# Patient Record
Sex: FEMALE | Race: WHITE | NOT HISPANIC OR LATINO | Employment: OTHER | URBAN - METROPOLITAN AREA
[De-identification: names, ages, dates, MRNs, and addresses within clinical notes are randomized per-mention and may not be internally consistent; named-entity substitution may affect disease eponyms.]

---

## 2018-01-13 NOTE — PROGRESS NOTES
Assessment    1  Acute sinusitis (461 9) (J01 90)   2  Benign essential hypertension (401 1) (I10)   3  Hyperlipidemia (272 4) (E78 5)   4  Impaired fasting glucose (790 21) (R73 01)    Plan  Acute sinusitis    · Doxycycline Hyclate 100 MG Oral Tablet; TAKE 1 TABLET EVERY 12 HOURS DAILY   · Promethazine VC/Codeine 6 25-5-10 MG/5ML Oral Syrup; TAKE 5 ML Bedtime PRN  cough    Discussion/Summary  Discussion Summary:   Will treat w/ abx based on recent exposure to pneumonia and hospital    Medication SE Review and Pt Understands Tx: Possible side effects of new medications were reviewed with the patient/guardian today  The treatment plan was reviewed with the patient/guardian  The patient/guardian understands and agrees with the treatment plan      Chief Complaint  Chief Complaint Free Text Note Form: Here to establish  H/o HTN, HLD  Has URI today      History of Present Illness  HPI: New patient here to establish  Having cough, congestion and sore throat, ear pain for past 5+ days  mom recently in the hospital for pneumonia  No current tx  H/O HTN, HLD - following up with a PCP and cardiologist in [place], [place] where she has another house  She states she has labs that she needs to get done for them  She is not sure why she is on metformin states her last doctor just put her on it  Review of Systems  Complete-Female:   Constitutional: feeling poorly, but no fever  ENT: as noted in HPI  Cardiovascular: No complaints of slow heart rate, no fast heart rate, no chest pain, no palpitations, no leg claudication, no lower extremity edema  Respiratory: cough  Endocrine: No complaints of proptosis, no hot flashes, no muscle weakness, no deepening of the voice, no feelings of weakness  Past Medical History  Active Problems And Past Medical History Reviewed: The active problems and past medical history were reviewed and updated today  Surgical History    1   History of Tonsillectomy    Family History 1  Family history of hypertension (V17 49) (Z82 49)    2  Family history of lung cancer (V16 1) (Z80 1)    Social History  Social History Reviewed: The social history was reviewed and updated today  Current Meds   1  AmLODIPine Besylate 5 MG Oral Tablet; Therapy: (Recorded:19Jan2016) to Recorded   2  Benicar 40 MG Oral Tablet; Therapy: (Recorded:19Jan2016) to Recorded   3  Carvedilol 6 25 MG Oral Tablet; Therapy: (Recorded:19Jan2016) to Recorded   4  Daily Value Multivitamin TABS; Therapy: (Recorded:19Jan2016) to Recorded   5  EC-81 Aspirin TBEC; Therapy: (Recorded:19Jan2016) to Recorded   6  Lovaza 1 GM Oral Capsule; Therapy: (Recorded:19Jan2016) to Recorded   7  MetFORMIN HCl  MG Oral Tablet Extended Release 24 Hour; Therapy: (Recorded:19Jan2016) to Recorded   8  Simvastatin 20 MG Oral Tablet; Therapy: (Recorded:19Jan2016) to Recorded   9  Vitamin C CAPS; Therapy: (Recorded:19Jan2016) to Recorded    Allergies    1  Penicillins    Vitals  Vital Signs [Data Includes: Current Encounter]    Recorded: [id number] 04:39PM   Temperature 98 5 F   Heart Rate 88   Systolic 670   Diastolic 82   Height 5 ft 3 in   Weight 211 lb 2 oz   BMI Calculated 37 4   BSA Calculated 1 98   O2 Saturation 96     Physical Exam    Constitutional   General appearance: No acute distress, well appearing and well nourished  Eyes   Conjunctiva and lids: No swelling, erythema or discharge  Pupils and irises: Equal, round and reactive to light  Ears, Nose, Mouth, and Throat   Otoscopic examination: Tympanic membranes translucent with normal light reflex  Canals patent without erythema  Oropharynx: Normal with no erythema, edema, exudate or lesions  Pulmonary   Respiratory effort: No increased work of breathing or signs of respiratory distress  Auscultation of lungs: Clear to auscultation  Cardiovascular   Auscultation of heart: Normal rate and rhythm, normal S1 and S2, without murmurs      Examination of extremities for edema and/or varicosities: Normal     Skin   Skin and subcutaneous tissue: Normal without rashes or lesions      Psychiatric   Orientation to person, place, and time: Normal     Mood and affect: Normal          Signatures   Electronically signed by : Christiano Stiles MD; Jan 19 2016  4:42PM EST                       (Author)

## 2019-10-04 ENCOUNTER — OFFICE VISIT (OUTPATIENT)
Dept: URGENT CARE | Facility: CLINIC | Age: 64
End: 2019-10-04
Payer: MEDICARE

## 2019-10-04 ENCOUNTER — APPOINTMENT (OUTPATIENT)
Dept: URGENT CARE | Facility: CLINIC | Age: 64
End: 2019-10-04
Payer: MEDICARE

## 2019-10-04 VITALS
SYSTOLIC BLOOD PRESSURE: 160 MMHG | BODY MASS INDEX: 36.4 KG/M2 | DIASTOLIC BLOOD PRESSURE: 74 MMHG | WEIGHT: 213.2 LBS | OXYGEN SATURATION: 93 % | HEART RATE: 83 BPM | RESPIRATION RATE: 18 BRPM | TEMPERATURE: 97.6 F | HEIGHT: 64 IN

## 2019-10-04 DIAGNOSIS — J01.90 ACUTE SINUSITIS, RECURRENCE NOT SPECIFIED, UNSPECIFIED LOCATION: Primary | ICD-10-CM

## 2019-10-04 PROCEDURE — G0463 HOSPITAL OUTPT CLINIC VISIT: HCPCS | Performed by: PHYSICIAN ASSISTANT

## 2019-10-04 PROCEDURE — 99203 OFFICE O/P NEW LOW 30 MIN: CPT | Performed by: PHYSICIAN ASSISTANT

## 2019-10-04 RX ORDER — DOXYCYCLINE 100 MG/1
100 TABLET ORAL 2 TIMES DAILY
Qty: 20 TABLET | Refills: 0 | Status: SHIPPED | OUTPATIENT
Start: 2019-10-04 | End: 2019-10-14

## 2019-10-04 RX ORDER — OMEGA-3-ACID ETHYL ESTERS 1 G/1
CAPSULE, LIQUID FILLED ORAL
COMMUNITY

## 2019-10-04 RX ORDER — ERGOCALCIFEROL (VITAMIN D2) 10 MCG
TABLET ORAL
COMMUNITY

## 2019-10-04 RX ORDER — SIMVASTATIN 20 MG
TABLET ORAL
COMMUNITY

## 2019-10-04 RX ORDER — AMLODIPINE BESYLATE 5 MG/1
TABLET ORAL
COMMUNITY

## 2019-10-04 RX ORDER — METFORMIN HYDROCHLORIDE 500 MG/1
TABLET, EXTENDED RELEASE ORAL
COMMUNITY

## 2019-10-04 RX ORDER — ASCORBIC ACID 1000 MG
TABLET, EXTENDED RELEASE ORAL
COMMUNITY
Start: 2017-02-23

## 2019-10-04 RX ORDER — CARVEDILOL 6.25 MG/1
TABLET ORAL
COMMUNITY
Start: 2019-04-08

## 2019-10-04 RX ORDER — LOSARTAN POTASSIUM 100 MG/1
100 TABLET ORAL DAILY
COMMUNITY

## 2019-10-04 NOTE — PATIENT INSTRUCTIONS
Take doxycycline 100 mg twice a day times 10 days  Avoid excessive sun exposure, or sunscreen if going to be in the sun  Use Flonase as directed  Increase fluid intake

## 2019-10-04 NOTE — PROGRESS NOTES
[facility]        NAME: Javier Macedo is a 75 y o  female  : 1955    MRN: [id number]  DATE: 2019  TIME: 10:23 AM    Assessment and Plan   Acute sinusitis, recurrence not specified, unspecified location [J01 90]  1  Acute sinusitis, recurrence not specified, unspecified location  doxycycline (ADOXA) 100 MG tablet         Patient Instructions       Follow up with PCP in 3-5 days  Proceed to  ER if symptoms worsen  Chief Complaint     Chief Complaint   Patient presents with    Sinusitis     monday    Cough     monday         History of Present Illness       Sinusitis   This is a new problem  Episode onset: x 5 days  The problem is unchanged  There has been no fever  Her pain is at a severity of 5/10  The pain is moderate  Associated symptoms include congestion, coughing, sinus pressure, sneezing and a sore throat  Pertinent negatives include no chills, ear pain, headaches or shortness of breath  Past treatments include nothing  The treatment provided mild relief  Review of Systems   Review of Systems   Constitutional: Negative for chills, fatigue and fever  HENT: Positive for congestion, sinus pressure, sneezing and sore throat  Negative for ear pain, hearing loss, postnasal drip and sinus pain  Eyes: Negative for pain and discharge  Respiratory: Positive for cough  Negative for chest tightness and shortness of breath  Cardiovascular: Negative for chest pain  Gastrointestinal: Negative for abdominal pain, constipation, nausea and vomiting  Genitourinary: Negative for difficulty urinating  Musculoskeletal: Negative for arthralgias and myalgias  Skin: Negative for rash  Neurological: Negative for dizziness and headaches  Psychiatric/Behavioral: Negative for behavioral problems           Current Medications       Current Outpatient Medications:     amLODIPine (NORVASC) 5 mg tablet, amlodipine 5 mg tablet, Disp: , Rfl:     Ascorbic Acid (VITAMIN C ER) 1000 MG TBCR, ascorbic acid (vitamin C) 1,000 mg chewable tablet, Disp: , Rfl:     ASPIRIN 81 PO, Take 1 tablet every day by oral route , Disp: , Rfl:     carvedilol (COREG) 6 25 mg tablet, TAKE ONE TABLET BY MOUTH TWICE A DAY, Disp: , Rfl:     losartan (COZAAR) 100 MG tablet, Take 100 mg by mouth daily, Disp: , Rfl:     metFORMIN (GLUCOPHAGE-XR) 500 mg 24 hr tablet, 4 tablets once daily, Disp: , Rfl:     Multiple Vitamin (DAILY VALUE MULTIVITAMIN) TABS, Take by mouth, Disp: , Rfl:     omega-3-acid ethyl esters (LOVAZA) 1 g capsule, 1 tablet daily, Disp: , Rfl:     simvastatin (ZOCOR) 20 mg tablet, 1 tablet daily, Disp: , Rfl:     doxycycline (ADOXA) 100 MG tablet, Take 1 tablet (100 mg total) by mouth 2 (two) times a day for 10 days, Disp: 20 tablet, Rfl: 0    Current Allergies     Allergies as of 10/04/2019 - Reviewed 10/04/2019   Allergen Reaction Noted    Penicillins  01/19/2016            The following portions of the patient's history were reviewed and updated as appropriate: allergies, current medications, past family history, past medical history, past social history, past surgical history and problem list      History reviewed  No pertinent past medical history  History reviewed  No pertinent surgical history  History reviewed  No pertinent family history  Medications have been verified  Objective   /74   Pulse 83   Temp 97 6 °F (36 4 °C) (Tympanic)   Resp 18   Ht 5' 4" (1 626 m)   Wt 96 7 kg (213 lb 3 2 oz)   SpO2 93%   BMI 36 60 kg/m²        Physical Exam     Physical Exam   Constitutional: She is oriented to person, place, and time  She appears well-developed and well-nourished  HENT:   Right Ear: Tympanic membrane and external ear normal    Left Ear: Tympanic membrane and external ear normal    Nose: Mucosal edema and rhinorrhea present  Right sinus exhibits maxillary sinus tenderness  Left sinus exhibits maxillary sinus tenderness     Mouth/Throat: Oropharynx is clear and moist and mucous membranes are normal        Neck: Normal range of motion  No edema present  Cardiovascular: Normal rate, regular rhythm, S1 normal, S2 normal and normal heart sounds  No murmur heard  Pulmonary/Chest: Effort normal and breath sounds normal  No respiratory distress  She has no wheezes  She has no rales  She exhibits no tenderness  Lymphadenopathy:     She has no cervical adenopathy  Neurological: She is alert and oriented to person, place, and time  Skin: Skin is warm, dry and intact  No rash noted  Psychiatric: She has a normal mood and affect  Her speech is normal and behavior is normal    Nursing note and vitals reviewed

## 2020-03-02 ENCOUNTER — APPOINTMENT (OUTPATIENT)
Dept: RADIOLOGY | Facility: CLINIC | Age: 65
End: 2020-03-02
Payer: MEDICARE

## 2020-03-02 ENCOUNTER — OFFICE VISIT (OUTPATIENT)
Dept: URGENT CARE | Facility: CLINIC | Age: 65
End: 2020-03-02
Payer: MEDICARE

## 2020-03-02 VITALS
HEART RATE: 96 BPM | OXYGEN SATURATION: 91 % | HEIGHT: 64 IN | BODY MASS INDEX: 37.05 KG/M2 | DIASTOLIC BLOOD PRESSURE: 82 MMHG | TEMPERATURE: 98.1 F | RESPIRATION RATE: 18 BRPM | SYSTOLIC BLOOD PRESSURE: 150 MMHG | WEIGHT: 217 LBS

## 2020-03-02 DIAGNOSIS — R05.9 COUGH: Primary | ICD-10-CM

## 2020-03-02 DIAGNOSIS — R05.9 COUGH: ICD-10-CM

## 2020-03-02 PROCEDURE — 99213 OFFICE O/P EST LOW 20 MIN: CPT | Performed by: PHYSICIAN ASSISTANT

## 2020-03-02 PROCEDURE — G0463 HOSPITAL OUTPT CLINIC VISIT: HCPCS | Performed by: PHYSICIAN ASSISTANT

## 2020-03-02 PROCEDURE — 71046 X-RAY EXAM CHEST 2 VIEWS: CPT

## 2020-03-02 RX ORDER — BENZONATATE 100 MG/1
100 CAPSULE ORAL 3 TIMES DAILY PRN
Qty: 30 CAPSULE | Refills: 0 | Status: SHIPPED | OUTPATIENT
Start: 2020-03-02

## 2020-03-02 RX ORDER — AZITHROMYCIN 250 MG/1
TABLET, FILM COATED ORAL
Qty: 6 TABLET | Refills: 0 | Status: SHIPPED | OUTPATIENT
Start: 2020-03-02 | End: 2020-03-06

## 2020-03-02 RX ORDER — DOXAZOSIN MESYLATE 1 MG/1
1 TABLET ORAL
COMMUNITY

## 2020-03-02 NOTE — PROGRESS NOTES
[facility]        NAME: Refdali Stevens is a 77 y o  female  : 1955    MRN: [id number]  DATE: 2020  TIME: 3:24 PM    Assessment and Plan   Cough [R05]  1  Cough  XR chest pa & lateral    azithromycin (ZITHROMAX) 250 mg tablet    benzonatate (TESSALON PERLES) 100 mg capsule         Patient Instructions     Patient Instructions   Chest x-ray shows possible right lower lobe infiltrate  Pulse ox is 91  We will treat with azithromycin for the cough  Follow up with PCP in 3-5 days  Proceed to  ER if symptoms worsen  Chief Complaint     Chief Complaint   Patient presents with    Cough     Pt c/o productive cough, head congestion, tatyana ear clogged x 1 week  History of Present Illness       77year-old female complains of 1 week of cough had congestion and ears feeling clogged  Cough is mildly productive  She tried promethazine DM at night for the cough  No nausea or vomiting  No ear drainage  No history of asthma or inhaler use  She denies shortness of breath  Review of Systems   Review of Systems   Constitutional: Negative for chills, fatigue and fever  HENT: Positive for congestion, ear pain and rhinorrhea  Negative for ear discharge and sore throat  Eyes: Negative for visual disturbance  Respiratory: Positive for cough and chest tightness  Negative for shortness of breath and wheezing  Cardiovascular: Negative for chest pain and palpitations  Gastrointestinal: Negative for diarrhea, nausea and vomiting  Neurological: Negative for dizziness           Current Medications       Current Outpatient Medications:     amLODIPine (NORVASC) 5 mg tablet, amlodipine 5 mg tablet, Disp: , Rfl:     Ascorbic Acid (VITAMIN C ER) 1000 MG TBCR, ascorbic acid (vitamin C) 1,000 mg chewable tablet, Disp: , Rfl:     ASPIRIN 81 PO, Take 1 tablet every day by oral route , Disp: , Rfl:     carvedilol (COREG) 6 25 mg tablet, TAKE ONE TABLET BY MOUTH TWICE A DAY, Disp: , Rfl:     doxazosin (CARDURA) 1 mg tablet, Take 1 mg by mouth daily at bedtime, Disp: , Rfl:     losartan (COZAAR) 100 MG tablet, Take 100 mg by mouth daily, Disp: , Rfl:     metFORMIN (GLUCOPHAGE-XR) 500 mg 24 hr tablet, 4 tablets once daily, Disp: , Rfl:     Multiple Vitamin (DAILY VALUE MULTIVITAMIN) TABS, Take by mouth, Disp: , Rfl:     omega-3-acid ethyl esters (LOVAZA) 1 g capsule, 1 tablet daily, Disp: , Rfl:     simvastatin (ZOCOR) 20 mg tablet, 1 tablet daily, Disp: , Rfl:     azithromycin (ZITHROMAX) 250 mg tablet, 2 tabs on day 1, then 1 tab daily for 4 days, Disp: 6 tablet, Rfl: 0    benzonatate (TESSALON PERLES) 100 mg capsule, Take 1 capsule (100 mg total) by mouth 3 (three) times a day as needed for cough, Disp: 30 capsule, Rfl: 0    Current Allergies     Allergies as of 03/02/2020 - Reviewed 03/02/2020   Allergen Reaction Noted    Penicillins  01/19/2016            The following portions of the patient's history were reviewed and updated as appropriate: allergies, current medications, past family history, past medical history, past social history, past surgical history and problem list      Past Medical History:   Diagnosis Date    Diabetes mellitus ([facility] [address] )     Hyperlipemia     Hypertension        Past Surgical History:   Procedure Laterality Date    TONSILLECTOMY      TUBAL LIGATION         Family History   Problem Relation Age of Onset    Hypertension Mother     Hypertension Father     Cancer Father     Heart disease Father          Medications have been verified  Objective   /82   Pulse 96   Temp 98 1 °F (36 7 °C) (Oral)   Resp 18   Ht 5' 4" (1 626 m)   Wt 98 4 kg (217 lb)   SpO2 91%   BMI 37 25 kg/m²        Physical Exam     Physical Exam   Constitutional: She is oriented to person, place, and time  She appears well-developed and well-nourished  No distress  HENT:   Head: Normocephalic and atraumatic     Right Ear: Tympanic membrane, external ear and ear canal normal  Tympanic membrane is not erythematous, not retracted and not bulging  No middle ear effusion  Left Ear: Tympanic membrane, external ear and ear canal normal  Tympanic membrane is not erythematous, not retracted and not bulging  No middle ear effusion  Nose: Nose normal  No mucosal edema or rhinorrhea  Right sinus exhibits no maxillary sinus tenderness and no frontal sinus tenderness  Left sinus exhibits no maxillary sinus tenderness and no frontal sinus tenderness  Mouth/Throat: Oropharynx is clear and moist and mucous membranes are normal  No posterior oropharyngeal erythema  Eyes: Pupils are equal, round, and reactive to light  Conjunctivae and EOM are normal  Right eye exhibits no chemosis and no discharge  Left eye exhibits no chemosis and no discharge  Right conjunctiva is not injected  Left conjunctiva is not injected  Neck: Normal range of motion  Neck supple  Cardiovascular: Normal rate, regular rhythm and normal heart sounds  Pulmonary/Chest: Effort normal and breath sounds normal  No respiratory distress  She has no wheezes  She has no rales  Lymphadenopathy:     She has no cervical adenopathy  Right cervical: No superficial cervical adenopathy present  Left cervical: No superficial cervical adenopathy present  Neurological: She is alert and oriented to person, place, and time  No cranial nerve deficit  Skin: Skin is warm and dry  No rash noted

## 2020-03-02 NOTE — PATIENT INSTRUCTIONS
Chest x-ray shows possible right lower lobe infiltrate  Pulse ox is 91  We will treat with azithromycin for the cough

## 2020-03-16 ENCOUNTER — APPOINTMENT (OUTPATIENT)
Dept: RADIOLOGY | Facility: CLINIC | Age: 65
End: 2020-03-16
Payer: MEDICARE

## 2020-03-16 ENCOUNTER — TELEPHONE (OUTPATIENT)
Dept: URGENT CARE | Facility: CLINIC | Age: 65
End: 2020-03-16

## 2020-03-16 ENCOUNTER — OFFICE VISIT (OUTPATIENT)
Dept: URGENT CARE | Facility: CLINIC | Age: 65
End: 2020-03-16
Payer: MEDICARE

## 2020-03-16 VITALS
OXYGEN SATURATION: 95 % | DIASTOLIC BLOOD PRESSURE: 78 MMHG | TEMPERATURE: 98.8 F | WEIGHT: 218 LBS | SYSTOLIC BLOOD PRESSURE: 156 MMHG | RESPIRATION RATE: 24 BRPM | BODY MASS INDEX: 37.42 KG/M2 | HEART RATE: 105 BPM

## 2020-03-16 DIAGNOSIS — R05.9 COUGH: ICD-10-CM

## 2020-03-16 DIAGNOSIS — J18.9 PNEUMONIA OF LEFT LOWER LOBE DUE TO INFECTIOUS ORGANISM: Primary | ICD-10-CM

## 2020-03-16 DIAGNOSIS — R05.9 COUGH: Primary | ICD-10-CM

## 2020-03-16 PROCEDURE — G0463 HOSPITAL OUTPT CLINIC VISIT: HCPCS | Performed by: FAMILY MEDICINE

## 2020-03-16 PROCEDURE — 71046 X-RAY EXAM CHEST 2 VIEWS: CPT

## 2020-03-16 PROCEDURE — 99214 OFFICE O/P EST MOD 30 MIN: CPT | Performed by: FAMILY MEDICINE

## 2020-03-16 RX ORDER — PREDNISONE 10 MG/1
TABLET ORAL
Qty: 15 TABLET | Refills: 0 | Status: SHIPPED | OUTPATIENT
Start: 2020-03-16 | End: 2020-03-22 | Stop reason: HOSPADM

## 2020-03-16 RX ORDER — BENZONATATE 200 MG/1
200 CAPSULE ORAL 3 TIMES DAILY PRN
Qty: 20 CAPSULE | Refills: 0 | Status: SHIPPED | OUTPATIENT
Start: 2020-03-16

## 2020-03-16 RX ORDER — ALBUTEROL SULFATE 90 UG/1
2 AEROSOL, METERED RESPIRATORY (INHALATION) EVERY 4 HOURS PRN
Qty: 8.5 G | Refills: 1 | Status: SHIPPED | OUTPATIENT
Start: 2020-03-16

## 2020-03-16 RX ORDER — DOXYCYCLINE 100 MG/1
100 TABLET ORAL 2 TIMES DAILY
Qty: 20 TABLET | Refills: 0 | Status: SHIPPED | OUTPATIENT
Start: 2020-03-16 | End: 2020-03-22 | Stop reason: HOSPADM

## 2020-03-16 NOTE — TELEPHONE ENCOUNTER
I reviewed the official chest x-ray reading with suggestion of lingular pneumonia  Antibiotic doxycycline 100 mg by mouth twice daily for 10 days was called into the pharmacy  I had left a message on patient's answering machine regarding the antibiotic and the x-ray reading 
02-Feb-2019 23:20

## 2020-03-16 NOTE — PROGRESS NOTES
[facility]        NAME: Isabela Neves is a 77 y o  female  : 1955    MRN: [id number]  DATE: 2020  TIME: 10:05 AM    Assessment and Plan   Cough [R05]  1  Cough  XR chest pa & lateral    predniSONE 10 mg tablet    benzonatate (TESSALON) 200 MG capsule    albuterol (ProAir HFA) 90 mcg/act inhaler         Patient Instructions   Persistent dry cough with wheezing  Viral illness versus persistent inflammatory changes after bronchitis  Will treat with steroid anti-inflammatory prednisone 10 mg-1 tablet by mouth up to 3 times a day for up to 5 days as needed for congestion and cough  Stop the medication if you notice increasing leg swelling or fluid retention  Benzonatate 200 mg-1 tablet by mouth up to 3 times a day as needed for cough  May also take plain Robitussin/Mucinex as needed for cough  Albuterol inhaler-1-2 puffs every 4-6 hours as needed for wheezing or shortness of breath  Follow-up with your primary care physician in no improvement in 4-5 days  Follow up with PCP in 3-5 days  Proceed to  ER if symptoms worsen  Chief Complaint     Chief Complaint   Patient presents with    Cold Like Symptoms     x 2 days, c/o cough, head and chest congestion  No fevers         History of Present Illness       Patient presents with recurrent dry cough, mild intermittent runny nose  No fevers  She was treated with a Z-Magno 2 weeks ago for similar cough with mild improvement  In the last several days patient has had worsening cough  No earaches, no sore throat  No GI symptoms  No rashes  No recent travel  Review of Systems   Review of Systems   Constitutional: Positive for fatigue  Negative for chills and fever  HENT: Positive for postnasal drip and rhinorrhea  Negative for sinus pain and sore throat  Respiratory: Positive for cough and wheezing  Cardiovascular: Negative for chest pain, palpitations and leg swelling     Gastrointestinal: Negative for diarrhea and vomiting  Skin: Negative for rash           Current Medications       Current Outpatient Medications:     albuterol (ProAir HFA) 90 mcg/act inhaler, Inhale 2 puffs every 4 (four) hours as needed for wheezing or shortness of breath, Disp: 8 5 g, Rfl: 1    amLODIPine (NORVASC) 5 mg tablet, amlodipine 5 mg tablet, Disp: , Rfl:     Ascorbic Acid (VITAMIN C ER) 1000 MG TBCR, ascorbic acid (vitamin C) 1,000 mg chewable tablet, Disp: , Rfl:     ASPIRIN 81 PO, Take 1 tablet every day by oral route , Disp: , Rfl:     benzonatate (TESSALON PERLES) 100 mg capsule, Take 1 capsule (100 mg total) by mouth 3 (three) times a day as needed for cough, Disp: 30 capsule, Rfl: 0    benzonatate (TESSALON) 200 MG capsule, Take 1 capsule (200 mg total) by mouth 3 (three) times a day as needed for cough, Disp: 20 capsule, Rfl: 0    carvedilol (COREG) 6 25 mg tablet, TAKE ONE TABLET BY MOUTH TWICE A DAY, Disp: , Rfl:     doxazosin (CARDURA) 1 mg tablet, Take 1 mg by mouth daily at bedtime, Disp: , Rfl:     losartan (COZAAR) 100 MG tablet, Take 100 mg by mouth daily, Disp: , Rfl:     metFORMIN (GLUCOPHAGE-XR) 500 mg 24 hr tablet, 4 tablets once daily, Disp: , Rfl:     Multiple Vitamin (DAILY VALUE MULTIVITAMIN) TABS, Take by mouth, Disp: , Rfl:     omega-3-acid ethyl esters (LOVAZA) 1 g capsule, 1 tablet daily, Disp: , Rfl:     predniSONE 10 mg tablet, 1 tab by mouth 3 times a day for 5 days, Disp: 15 tablet, Rfl: 0    simvastatin (ZOCOR) 20 mg tablet, 1 tablet daily, Disp: , Rfl:     Current Allergies     Allergies as of 03/16/2020 - Reviewed 03/16/2020   Allergen Reaction Noted    Penicillins  01/19/2016            The following portions of the patient's history were reviewed and updated as appropriate: allergies, current medications, past family history, past medical history, past social history, past surgical history and problem list      Past Medical History:   Diagnosis Date    Diabetes mellitus ([address] )     Hyperlipemia  Hypertension        Past Surgical History:   Procedure Laterality Date    TONSILLECTOMY      TUBAL LIGATION         Family History   Problem Relation Age of Onset    Hypertension Mother     Hypertension Father     Cancer Father     Heart disease Father          Medications have been verified  Objective   BP (!) 174/70   Pulse 105   Temp 98 8 °F (37 1 °C) (Oral)   Resp (!) 24   Wt 98 9 kg (218 lb)   SpO2 95%   BMI 37 42 kg/m²        Physical Exam     Physical Exam   Constitutional: She appears well-developed and well-nourished  No distress  HENT:   Right Ear: External ear normal    Left Ear: External ear normal    Mouth/Throat: Oropharynx is clear and moist  No oropharyngeal exudate  Eyes: Right eye exhibits no discharge  Left eye exhibits no discharge  No scleral icterus  Neck: No tracheal deviation present  No thyromegaly present  Cardiovascular: Regular rhythm  Exam reveals no friction rub  Murmur heard  Borderline tachycardia   Pulmonary/Chest: Effort normal  No stridor  No respiratory distress  She has wheezes  She has no rales  Musculoskeletal: She exhibits no edema  Lymphadenopathy:     She has no cervical adenopathy  Skin: Skin is warm and dry  Capillary refill takes less than 2 seconds  No rash noted  Psychiatric: She has a normal mood and affect  Chest x-ray-no acute cardiopulmonary disease  No significant change from prior x-ray on 03/02/20

## 2020-03-16 NOTE — PATIENT INSTRUCTIONS
Persistent dry cough with wheezing  Viral illness versus persistent inflammatory changes after bronchitis  Will treat with steroid anti-inflammatory prednisone 10 mg-1 tablet by mouth up to 3 times a day for up to 5 days as needed for congestion and cough  Stop the medication if you notice increasing leg swelling or fluid retention  Benzonatate 200 mg-1 tablet by mouth up to 3 times a day as needed for cough  May also take plain Robitussin/Mucinex as needed for cough  Albuterol inhaler-1-2 puffs every 4-6 hours as needed for wheezing or shortness of breath  Follow-up with your primary care physician in no improvement in 4-5 days

## 2020-03-19 ENCOUNTER — HOSPITAL ENCOUNTER (INPATIENT)
Facility: HOSPITAL | Age: 65
LOS: 3 days | Discharge: HOME | DRG: 194 | End: 2020-03-22
Attending: EMERGENCY MEDICINE | Admitting: INTERNAL MEDICINE
Payer: MEDICARE

## 2020-03-19 ENCOUNTER — APPOINTMENT (OUTPATIENT)
Dept: RADIOLOGY | Facility: CLINIC | Age: 65
End: 2020-03-19
Payer: MEDICARE

## 2020-03-19 ENCOUNTER — OFFICE VISIT (OUTPATIENT)
Dept: URGENT CARE | Facility: CLINIC | Age: 65
End: 2020-03-19
Payer: MEDICARE

## 2020-03-19 VITALS
TEMPERATURE: 98.8 F | RESPIRATION RATE: 18 BRPM | OXYGEN SATURATION: 91 % | HEIGHT: 64 IN | SYSTOLIC BLOOD PRESSURE: 154 MMHG | HEART RATE: 90 BPM | DIASTOLIC BLOOD PRESSURE: 60 MMHG | BODY MASS INDEX: 37.05 KG/M2 | WEIGHT: 217 LBS

## 2020-03-19 DIAGNOSIS — J18.9 PNEUMONIA: ICD-10-CM

## 2020-03-19 DIAGNOSIS — R05.9 COUGH: ICD-10-CM

## 2020-03-19 DIAGNOSIS — J18.9 LINGULAR PNEUMONIA: Primary | ICD-10-CM

## 2020-03-19 DIAGNOSIS — J20.9 ACUTE TRACHEOBRONCHITIS: ICD-10-CM

## 2020-03-19 DIAGNOSIS — R05.9 COUGH: Primary | ICD-10-CM

## 2020-03-19 PROBLEM — E11.9 TYPE 2 DIABETES MELLITUS WITHOUT COMPLICATION, WITHOUT LONG-TERM CURRENT USE OF INSULIN (HCC): Status: ACTIVE | Noted: 2018-10-16

## 2020-03-19 PROBLEM — E78.5 HYPERLIPIDEMIA: Status: ACTIVE | Noted: 2020-03-19

## 2020-03-19 PROBLEM — I65.23 BILATERAL CAROTID ARTERY STENOSIS: Status: ACTIVE | Noted: 2018-10-16

## 2020-03-19 PROBLEM — I10 ESSENTIAL HYPERTENSION: Status: ACTIVE | Noted: 2020-03-19

## 2020-03-19 LAB
ALBUMIN SERPL BCP-MCNC: 4 G/DL (ref 3.5–5)
ALP SERPL-CCNC: 118 U/L (ref 46–116)
ALT SERPL W P-5'-P-CCNC: 60 U/L (ref 12–78)
ANION GAP SERPL CALCULATED.3IONS-SCNC: 11 MMOL/L (ref 4–13)
APTT PPP: 33 SECONDS (ref 23–37)
AST SERPL W P-5'-P-CCNC: 55 U/L (ref 5–45)
BACTERIA UR QL AUTO: ABNORMAL /HPF
BASOPHILS # BLD AUTO: 0.02 THOUSANDS/ΜL (ref 0–0.1)
BASOPHILS NFR BLD AUTO: 0 % (ref 0–1)
BILIRUB SERPL-MCNC: 0.3 MG/DL (ref 0.2–1)
BILIRUB UR QL STRIP: NEGATIVE
BUN SERPL-MCNC: 25 MG/DL (ref 5–25)
CALCIUM SERPL-MCNC: 9.8 MG/DL (ref 8.3–10.1)
CHLORIDE SERPL-SCNC: 105 MMOL/L (ref 100–108)
CLARITY UR: ABNORMAL
CO2 SERPL-SCNC: 30 MMOL/L (ref 21–32)
COLOR UR: ABNORMAL
CREAT SERPL-MCNC: 0.72 MG/DL (ref 0.6–1.3)
EOSINOPHIL # BLD AUTO: 0.01 THOUSAND/ΜL (ref 0–0.61)
EOSINOPHIL NFR BLD AUTO: 0 % (ref 0–6)
ERYTHROCYTE [DISTWIDTH] IN BLOOD BY AUTOMATED COUNT: 14.6 % (ref 11.6–15.1)
FLUAV RNA NPH QL NAA+PROBE: NORMAL
FLUBV RNA NPH QL NAA+PROBE: NORMAL
GFR SERPL CREATININE-BSD FRML MDRD: 87 ML/MIN/1.73SQ M
GLUCOSE SERPL-MCNC: 113 MG/DL (ref 65–140)
GLUCOSE SERPL-MCNC: 117 MG/DL (ref 65–140)
GLUCOSE UR STRIP-MCNC: NEGATIVE MG/DL
HCT VFR BLD AUTO: 42.5 % (ref 34.8–46.1)
HGB BLD-MCNC: 13.6 G/DL (ref 11.5–15.4)
HGB UR QL STRIP.AUTO: ABNORMAL
IMM GRANULOCYTES # BLD AUTO: 0.04 THOUSAND/UL (ref 0–0.2)
IMM GRANULOCYTES NFR BLD AUTO: 1 % (ref 0–2)
INR PPP: 0.99 (ref 0.84–1.19)
KETONES UR STRIP-MCNC: NEGATIVE MG/DL
LACTATE SERPL-SCNC: 0.8 MMOL/L (ref 0.5–2)
LACTATE SERPL-SCNC: 0.9 MMOL/L (ref 0.5–2)
LEUKOCYTE ESTERASE UR QL STRIP: NEGATIVE
LYMPHOCYTES # BLD AUTO: 1.71 THOUSANDS/ΜL (ref 0.6–4.47)
LYMPHOCYTES NFR BLD AUTO: 25 % (ref 14–44)
MCH RBC QN AUTO: 31.3 PG (ref 26.8–34.3)
MCHC RBC AUTO-ENTMCNC: 32 G/DL (ref 31.4–37.4)
MCV RBC AUTO: 98 FL (ref 82–98)
MONOCYTES # BLD AUTO: 0.73 THOUSAND/ΜL (ref 0.17–1.22)
MONOCYTES NFR BLD AUTO: 11 % (ref 4–12)
NEUTROPHILS # BLD AUTO: 4.28 THOUSANDS/ΜL (ref 1.85–7.62)
NEUTS SEG NFR BLD AUTO: 63 % (ref 43–75)
NITRITE UR QL STRIP: NEGATIVE
NON-SQ EPI CELLS URNS QL MICRO: ABNORMAL /HPF
NRBC BLD AUTO-RTO: 0 /100 WBCS
PH UR STRIP.AUTO: 5.5 [PH]
PLATELET # BLD AUTO: 308 THOUSANDS/UL (ref 149–390)
PMV BLD AUTO: 10.1 FL (ref 8.9–12.7)
POTASSIUM SERPL-SCNC: 4.2 MMOL/L (ref 3.5–5.3)
PROT SERPL-MCNC: 7.7 G/DL (ref 6.4–8.2)
PROT UR STRIP-MCNC: ABNORMAL MG/DL
PROTHROMBIN TIME: 13.1 SECONDS (ref 11.6–14.5)
RBC # BLD AUTO: 4.34 MILLION/UL (ref 3.81–5.12)
RBC #/AREA URNS AUTO: ABNORMAL /HPF
RSV RNA NPH QL NAA+PROBE: NORMAL
SODIUM SERPL-SCNC: 146 MMOL/L (ref 136–145)
SP GR UR STRIP.AUTO: >=1.03 (ref 1–1.03)
TROPONIN I SERPL-MCNC: <0.02 NG/ML
URATE CRY URNS QL MICRO: ABNORMAL /HPF
UROBILINOGEN UR QL STRIP.AUTO: 0.2 E.U./DL
WBC # BLD AUTO: 6.79 THOUSAND/UL (ref 4.31–10.16)
WBC #/AREA URNS AUTO: ABNORMAL /HPF

## 2020-03-19 PROCEDURE — 99223 1ST HOSP IP/OBS HIGH 75: CPT | Performed by: PHYSICIAN ASSISTANT

## 2020-03-19 PROCEDURE — 36415 COLL VENOUS BLD VENIPUNCTURE: CPT | Performed by: EMERGENCY MEDICINE

## 2020-03-19 PROCEDURE — 83605 ASSAY OF LACTIC ACID: CPT | Performed by: EMERGENCY MEDICINE

## 2020-03-19 PROCEDURE — 93005 ELECTROCARDIOGRAM TRACING: CPT

## 2020-03-19 PROCEDURE — 84484 ASSAY OF TROPONIN QUANT: CPT | Performed by: EMERGENCY MEDICINE

## 2020-03-19 PROCEDURE — 81001 URINALYSIS AUTO W/SCOPE: CPT | Performed by: EMERGENCY MEDICINE

## 2020-03-19 PROCEDURE — 87631 RESP VIRUS 3-5 TARGETS: CPT | Performed by: EMERGENCY MEDICINE

## 2020-03-19 PROCEDURE — G0463 HOSPITAL OUTPT CLINIC VISIT: HCPCS | Performed by: PHYSICIAN ASSISTANT

## 2020-03-19 PROCEDURE — 87040 BLOOD CULTURE FOR BACTERIA: CPT | Performed by: EMERGENCY MEDICINE

## 2020-03-19 PROCEDURE — 85730 THROMBOPLASTIN TIME PARTIAL: CPT | Performed by: EMERGENCY MEDICINE

## 2020-03-19 PROCEDURE — 80053 COMPREHEN METABOLIC PANEL: CPT | Performed by: EMERGENCY MEDICINE

## 2020-03-19 PROCEDURE — 99213 OFFICE O/P EST LOW 20 MIN: CPT | Performed by: PHYSICIAN ASSISTANT

## 2020-03-19 PROCEDURE — 85610 PROTHROMBIN TIME: CPT | Performed by: EMERGENCY MEDICINE

## 2020-03-19 PROCEDURE — 94640 AIRWAY INHALATION TREATMENT: CPT | Performed by: EMERGENCY MEDICINE

## 2020-03-19 PROCEDURE — 96365 THER/PROPH/DIAG IV INF INIT: CPT

## 2020-03-19 PROCEDURE — 71046 X-RAY EXAM CHEST 2 VIEWS: CPT

## 2020-03-19 PROCEDURE — 99284 EMERGENCY DEPT VISIT MOD MDM: CPT

## 2020-03-19 PROCEDURE — 85025 COMPLETE CBC W/AUTO DIFF WBC: CPT | Performed by: EMERGENCY MEDICINE

## 2020-03-19 PROCEDURE — 84145 PROCALCITONIN (PCT): CPT | Performed by: EMERGENCY MEDICINE

## 2020-03-19 PROCEDURE — 94640 AIRWAY INHALATION TREATMENT: CPT

## 2020-03-19 PROCEDURE — 82948 REAGENT STRIP/BLOOD GLUCOSE: CPT

## 2020-03-19 PROCEDURE — 99285 EMERGENCY DEPT VISIT HI MDM: CPT | Performed by: EMERGENCY MEDICINE

## 2020-03-19 RX ORDER — PRAVASTATIN SODIUM 40 MG
40 TABLET ORAL
Status: DISCONTINUED | OUTPATIENT
Start: 2020-03-20 | End: 2020-03-22 | Stop reason: HOSPADM

## 2020-03-19 RX ORDER — ASPIRIN 81 MG/1
81 TABLET, CHEWABLE ORAL DAILY
Status: DISCONTINUED | OUTPATIENT
Start: 2020-03-20 | End: 2020-03-22 | Stop reason: HOSPADM

## 2020-03-19 RX ORDER — IPRATROPIUM BROMIDE AND ALBUTEROL SULFATE 2.5; .5 MG/3ML; MG/3ML
3 SOLUTION RESPIRATORY (INHALATION)
Status: DISCONTINUED | OUTPATIENT
Start: 2020-03-20 | End: 2020-03-20

## 2020-03-19 RX ORDER — AMLODIPINE BESYLATE 5 MG/1
5 TABLET ORAL DAILY
Status: DISCONTINUED | OUTPATIENT
Start: 2020-03-20 | End: 2020-03-22 | Stop reason: HOSPADM

## 2020-03-19 RX ORDER — ACETAMINOPHEN 325 MG/1
650 TABLET ORAL EVERY 6 HOURS PRN
Status: DISCONTINUED | OUTPATIENT
Start: 2020-03-19 | End: 2020-03-22 | Stop reason: HOSPADM

## 2020-03-19 RX ORDER — ONDANSETRON 2 MG/ML
4 INJECTION INTRAMUSCULAR; INTRAVENOUS EVERY 6 HOURS PRN
Status: DISCONTINUED | OUTPATIENT
Start: 2020-03-19 | End: 2020-03-22 | Stop reason: HOSPADM

## 2020-03-19 RX ORDER — SODIUM CHLORIDE, SODIUM LACTATE, POTASSIUM CHLORIDE, CALCIUM CHLORIDE 600; 310; 30; 20 MG/100ML; MG/100ML; MG/100ML; MG/100ML
125 INJECTION, SOLUTION INTRAVENOUS CONTINUOUS
Status: DISPENSED | OUTPATIENT
Start: 2020-03-19 | End: 2020-03-20

## 2020-03-19 RX ORDER — DOCUSATE SODIUM 100 MG/1
100 CAPSULE, LIQUID FILLED ORAL 2 TIMES DAILY
Status: DISCONTINUED | OUTPATIENT
Start: 2020-03-20 | End: 2020-03-22 | Stop reason: HOSPADM

## 2020-03-19 RX ORDER — DOXAZOSIN MESYLATE 1 MG/1
1 TABLET ORAL
Status: DISCONTINUED | OUTPATIENT
Start: 2020-03-19 | End: 2020-03-22 | Stop reason: HOSPADM

## 2020-03-19 RX ORDER — CARVEDILOL 6.25 MG/1
6.25 TABLET ORAL 2 TIMES DAILY
Status: DISCONTINUED | OUTPATIENT
Start: 2020-03-20 | End: 2020-03-22 | Stop reason: HOSPADM

## 2020-03-19 RX ORDER — ALBUTEROL SULFATE 2.5 MG/3ML
5 SOLUTION RESPIRATORY (INHALATION) ONCE
Status: COMPLETED | OUTPATIENT
Start: 2020-03-19 | End: 2020-03-19

## 2020-03-19 RX ORDER — LOSARTAN POTASSIUM 50 MG/1
100 TABLET ORAL DAILY
Status: DISCONTINUED | OUTPATIENT
Start: 2020-03-20 | End: 2020-03-22 | Stop reason: HOSPADM

## 2020-03-19 RX ADMIN — SODIUM CHLORIDE, SODIUM LACTATE, POTASSIUM CHLORIDE, AND CALCIUM CHLORIDE 125 ML/HR: .6; .31; .03; .02 INJECTION, SOLUTION INTRAVENOUS at 23:51

## 2020-03-19 RX ADMIN — IPRATROPIUM BROMIDE 0.5 MG: 0.5 SOLUTION RESPIRATORY (INHALATION) at 21:07

## 2020-03-19 RX ADMIN — AZITHROMYCIN MONOHYDRATE 500 MG: 500 INJECTION, POWDER, LYOPHILIZED, FOR SOLUTION INTRAVENOUS at 22:14

## 2020-03-19 RX ADMIN — DOXAZOSIN 1 MG: 1 TABLET ORAL at 23:51

## 2020-03-19 RX ADMIN — ALBUTEROL SULFATE 5 MG: 2.5 SOLUTION RESPIRATORY (INHALATION) at 21:07

## 2020-03-19 RX ADMIN — CEFTRIAXONE SODIUM 1000 MG: 10 INJECTION, POWDER, FOR SOLUTION INTRAVENOUS at 21:17

## 2020-03-19 RX ADMIN — SODIUM CHLORIDE 1000 ML: 0.9 INJECTION, SOLUTION INTRAVENOUS at 21:10

## 2020-03-19 RX ADMIN — GUAIFENESIN 200 MG: 100 SOLUTION ORAL at 23:51

## 2020-03-19 NOTE — PROGRESS NOTES
[address] Now        NAME: Sherle Krabbe is a 77 y o  female  : 1955    MRN: [id number]  DATE: 2020  TIME: 7:28 PM    Assessment and Plan   Cough [R05]  1  Cough  XR chest pa & lateral    Transfer to other facility     Discussed case with Parvin Gomez at [facility]  Patient has failed  outpatient treatment  Chest x-ray reviewed by myself when compared to 2020 there are no significant changes  Patient continues to have cough, shortness of breath and hypoxia  Patient saturation between 90 and 91% at rest on RA  Pt ambulated around the clinic oxygen saturation on room air is consistent at 90% -112  Patients  will transport patient via personal vehicle to [facility] for further evaluation  Patient Instructions     Follow up with PCP in 3-5 days  Proceed to  ER if symptoms worsen  Chief Complaint     Chief Complaint   Patient presents with    Cough     c/o cough since monday  Cough has not gotten better and wheezing  History of Present Illness       57-year-old female presents for evaluation of cough  Patient has had a ongoing cough and shortness of breath for over 2 and half weeks  Patient was seen evaluated for similar symptoms on 2020  CXR RLL pneumonia on   Patient was placed on as the throat mycin and cough medication in which she states she finished  She states she had continued symptoms which prompted her visited for the cough on 2020  CXR on  showed a lingular pneumonia  Patient placed on doxycycline and steroids  She states she has had no relief  She continues to have a dry harsh cough  Review of Systems   Review of Systems   Constitutional: Negative for chills, fatigue and fever  HENT: Negative for congestion, ear pain, sinus pain, sore throat and trouble swallowing  Eyes: Negative for pain, discharge and redness  Respiratory: Positive for cough   Negative for chest tightness, shortness of breath and wheezing  Cardiovascular: Negative for chest pain, palpitations and leg swelling  Gastrointestinal: Negative for abdominal pain, diarrhea, nausea and vomiting  Musculoskeletal: Negative for arthralgias, joint swelling and myalgias  Skin: Negative for rash  Neurological: Negative for dizziness, weakness, numbness and headaches           Current Medications       Current Outpatient Medications:     albuterol (ProAir HFA) 90 mcg/act inhaler, Inhale 2 puffs every 4 (four) hours as needed for wheezing or shortness of breath, Disp: 8 5 g, Rfl: 1    amLODIPine (NORVASC) 5 mg tablet, amlodipine 5 mg tablet, Disp: , Rfl:     Ascorbic Acid (VITAMIN C ER) 1000 MG TBCR, ascorbic acid (vitamin C) 1,000 mg chewable tablet, Disp: , Rfl:     ASPIRIN 81 PO, Take 1 tablet every day by oral route , Disp: , Rfl:     benzonatate (TESSALON) 200 MG capsule, Take 1 capsule (200 mg total) by mouth 3 (three) times a day as needed for cough, Disp: 20 capsule, Rfl: 0    carvedilol (COREG) 6 25 mg tablet, TAKE ONE TABLET BY MOUTH TWICE A DAY, Disp: , Rfl:     doxazosin (CARDURA) 1 mg tablet, Take 1 mg by mouth daily at bedtime, Disp: , Rfl:     doxycycline (ADOXA) 100 MG tablet, Take 1 tablet (100 mg total) by mouth 2 (two) times a day for 10 days, Disp: 20 tablet, Rfl: 0    losartan (COZAAR) 100 MG tablet, Take 100 mg by mouth daily, Disp: , Rfl:     metFORMIN (GLUCOPHAGE-XR) 500 mg 24 hr tablet, 4 tablets once daily, Disp: , Rfl:     Multiple Vitamin (DAILY VALUE MULTIVITAMIN) TABS, Take by mouth, Disp: , Rfl:     omega-3-acid ethyl esters (LOVAZA) 1 g capsule, 1 tablet daily, Disp: , Rfl:     predniSONE 10 mg tablet, 1 tab by mouth 3 times a day for 5 days, Disp: 15 tablet, Rfl: 0    simvastatin (ZOCOR) 20 mg tablet, 1 tablet daily, Disp: , Rfl:     benzonatate (TESSALON PERLES) 100 mg capsule, Take 1 capsule (100 mg total) by mouth 3 (three) times a day as needed for cough (Patient not taking: Reported on 3/19/2020), Disp: 30 capsule, Rfl: 0    Current Allergies     Allergies as of 03/19/2020 - Reviewed 03/19/2020   Allergen Reaction Noted    Penicillins  01/19/2016            The following portions of the patient's history were reviewed and updated as appropriate: allergies, current medications, past family history, past medical history, past social history, past surgical history and problem list      Past Medical History:   Diagnosis Date    Diabetes mellitus ([address] )     Hyperlipemia     Hypertension        Past Surgical History:   Procedure Laterality Date    TONSILLECTOMY      TUBAL LIGATION         Family History   Problem Relation Age of Onset    Hypertension Mother     Hypertension Father     Cancer Father     Heart disease Father          Medications have been verified  Objective   /60   Pulse 90   Temp 98 8 °F (37 1 °C) (Temporal)   Resp 18   Ht 5' 4" (1 626 m)   Wt 98 4 kg (217 lb)   SpO2 91%   BMI 37 25 kg/m²        Physical Exam     Physical Exam   Constitutional: She appears well-developed and well-nourished  HENT:   Head: Normocephalic  Right Ear: Hearing and tympanic membrane normal    Left Ear: Hearing and tympanic membrane normal    Nose: No mucosal edema  Mouth/Throat: Uvula is midline and mucous membranes are normal  Posterior oropharyngeal erythema present  Cardiovascular: Normal rate and regular rhythm  Pulmonary/Chest: Effort normal  She has wheezes  Nursing note and vitals reviewed

## 2020-03-20 PROBLEM — E87.0 HYPERNATREMIA: Status: ACTIVE | Noted: 2020-03-20

## 2020-03-20 PROBLEM — J20.9 ACUTE TRACHEOBRONCHITIS: Status: ACTIVE | Noted: 2020-03-20

## 2020-03-20 LAB
ANION GAP SERPL CALCULATED.3IONS-SCNC: 9 MMOL/L (ref 4–13)
ATRIAL RATE: 84 BPM
BASOPHILS # BLD MANUAL: 0 THOUSAND/UL (ref 0–0.1)
BASOPHILS NFR MAR MANUAL: 0 % (ref 0–1)
BUN SERPL-MCNC: 21 MG/DL (ref 5–25)
CALCIUM SERPL-MCNC: 8.8 MG/DL (ref 8.3–10.1)
CHLORIDE SERPL-SCNC: 107 MMOL/L (ref 100–108)
CO2 SERPL-SCNC: 28 MMOL/L (ref 21–32)
CREAT SERPL-MCNC: 0.64 MG/DL (ref 0.6–1.3)
EOSINOPHIL # BLD MANUAL: 0 THOUSAND/UL (ref 0–0.4)
EOSINOPHIL NFR BLD MANUAL: 0 % (ref 0–6)
ERYTHROCYTE [DISTWIDTH] IN BLOOD BY AUTOMATED COUNT: 14.8 % (ref 11.6–15.1)
GFR SERPL CREATININE-BSD FRML MDRD: 93 ML/MIN/1.73SQ M
GLUCOSE SERPL-MCNC: 104 MG/DL (ref 65–140)
GLUCOSE SERPL-MCNC: 119 MG/DL (ref 65–140)
GLUCOSE SERPL-MCNC: 141 MG/DL (ref 65–140)
GLUCOSE SERPL-MCNC: 161 MG/DL (ref 65–140)
GLUCOSE SERPL-MCNC: 98 MG/DL (ref 65–140)
HCT VFR BLD AUTO: 38.8 % (ref 34.8–46.1)
HGB BLD-MCNC: 11.9 G/DL (ref 11.5–15.4)
L PNEUMO1 AG UR QL IA.RAPID: NEGATIVE
LYMPHOCYTES # BLD AUTO: 1.3 THOUSAND/UL (ref 0.6–4.47)
LYMPHOCYTES # BLD AUTO: 22 % (ref 14–44)
MCH RBC QN AUTO: 30.4 PG (ref 26.8–34.3)
MCHC RBC AUTO-ENTMCNC: 30.7 G/DL (ref 31.4–37.4)
MCV RBC AUTO: 99 FL (ref 82–98)
MONOCYTES # BLD AUTO: 0.89 THOUSAND/UL (ref 0–1.22)
MONOCYTES NFR BLD: 15 % (ref 4–12)
NEUTROPHILS # BLD MANUAL: 3.73 THOUSAND/UL (ref 1.85–7.62)
NEUTS BAND NFR BLD MANUAL: 5 % (ref 0–8)
NEUTS SEG NFR BLD AUTO: 58 % (ref 43–75)
NRBC BLD AUTO-RTO: 0 /100 WBCS
P AXIS: 77 DEGREES
PLATELET # BLD AUTO: 254 THOUSANDS/UL (ref 149–390)
PLATELET BLD QL SMEAR: ADEQUATE
PMV BLD AUTO: 9.5 FL (ref 8.9–12.7)
POTASSIUM SERPL-SCNC: 3.3 MMOL/L (ref 3.5–5.3)
PR INTERVAL: 150 MS
PROCALCITONIN SERPL-MCNC: <0.05 NG/ML
PROCALCITONIN SERPL-MCNC: <0.05 NG/ML
QRS AXIS: 86 DEGREES
QRSD INTERVAL: 90 MS
QT INTERVAL: 362 MS
QTC INTERVAL: 427 MS
RBC # BLD AUTO: 3.92 MILLION/UL (ref 3.81–5.12)
S PNEUM AG UR QL: NEGATIVE
SODIUM SERPL-SCNC: 144 MMOL/L (ref 136–145)
T WAVE AXIS: 66 DEGREES
TOTAL CELLS COUNTED SPEC: 100
VENTRICULAR RATE: 84 BPM
WBC # BLD AUTO: 5.92 THOUSAND/UL (ref 4.31–10.16)

## 2020-03-20 PROCEDURE — 94640 AIRWAY INHALATION TREATMENT: CPT

## 2020-03-20 PROCEDURE — 82948 REAGENT STRIP/BLOOD GLUCOSE: CPT

## 2020-03-20 PROCEDURE — 84145 PROCALCITONIN (PCT): CPT | Performed by: PHYSICIAN ASSISTANT

## 2020-03-20 PROCEDURE — 94760 N-INVAS EAR/PLS OXIMETRY 1: CPT

## 2020-03-20 PROCEDURE — 80048 BASIC METABOLIC PNL TOTAL CA: CPT | Performed by: PHYSICIAN ASSISTANT

## 2020-03-20 PROCEDURE — 87449 NOS EACH ORGANISM AG IA: CPT | Performed by: PHYSICIAN ASSISTANT

## 2020-03-20 PROCEDURE — 85027 COMPLETE CBC AUTOMATED: CPT | Performed by: PHYSICIAN ASSISTANT

## 2020-03-20 PROCEDURE — 99233 SBSQ HOSP IP/OBS HIGH 50: CPT | Performed by: INTERNAL MEDICINE

## 2020-03-20 PROCEDURE — 85007 BL SMEAR W/DIFF WBC COUNT: CPT | Performed by: PHYSICIAN ASSISTANT

## 2020-03-20 PROCEDURE — 93010 ELECTROCARDIOGRAM REPORT: CPT | Performed by: INTERNAL MEDICINE

## 2020-03-20 RX ORDER — PREDNISONE 20 MG/1
40 TABLET ORAL DAILY
Status: DISCONTINUED | OUTPATIENT
Start: 2020-03-20 | End: 2020-03-22 | Stop reason: HOSPADM

## 2020-03-20 RX ORDER — LEVALBUTEROL INHALATION SOLUTION 0.63 MG/3ML
0.63 SOLUTION RESPIRATORY (INHALATION)
Status: DISCONTINUED | OUTPATIENT
Start: 2020-03-20 | End: 2020-03-20

## 2020-03-20 RX ORDER — LEVALBUTEROL INHALATION SOLUTION 0.63 MG/3ML
0.63 SOLUTION RESPIRATORY (INHALATION)
Status: DISCONTINUED | OUTPATIENT
Start: 2020-03-20 | End: 2020-03-22 | Stop reason: HOSPADM

## 2020-03-20 RX ORDER — ALBUTEROL SULFATE 90 UG/1
2 AEROSOL, METERED RESPIRATORY (INHALATION) EVERY 4 HOURS PRN
Status: DISCONTINUED | OUTPATIENT
Start: 2020-03-20 | End: 2020-03-22 | Stop reason: HOSPADM

## 2020-03-20 RX ADMIN — ENOXAPARIN SODIUM 40 MG: 40 INJECTION SUBCUTANEOUS at 10:00

## 2020-03-20 RX ADMIN — GUAIFENESIN 200 MG: 100 SOLUTION ORAL at 15:27

## 2020-03-20 RX ADMIN — LEVALBUTEROL HYDROCHLORIDE 0.63 MG: 0.63 SOLUTION RESPIRATORY (INHALATION) at 19:21

## 2020-03-20 RX ADMIN — GUAIFENESIN 200 MG: 100 SOLUTION ORAL at 21:59

## 2020-03-20 RX ADMIN — CARVEDILOL 6.25 MG: 6.25 TABLET, FILM COATED ORAL at 10:00

## 2020-03-20 RX ADMIN — DOCUSATE SODIUM 100 MG: 100 CAPSULE, LIQUID FILLED ORAL at 17:31

## 2020-03-20 RX ADMIN — AZITHROMYCIN MONOHYDRATE 500 MG: 500 INJECTION, POWDER, LYOPHILIZED, FOR SOLUTION INTRAVENOUS at 22:48

## 2020-03-20 RX ADMIN — CARVEDILOL 6.25 MG: 6.25 TABLET, FILM COATED ORAL at 17:31

## 2020-03-20 RX ADMIN — ASPIRIN 81 MG 81 MG: 81 TABLET ORAL at 10:00

## 2020-03-20 RX ADMIN — AMLODIPINE BESYLATE 5 MG: 5 TABLET ORAL at 10:00

## 2020-03-20 RX ADMIN — DOXAZOSIN 1 MG: 1 TABLET ORAL at 22:00

## 2020-03-20 RX ADMIN — PREDNISONE 40 MG: 20 TABLET ORAL at 10:31

## 2020-03-20 RX ADMIN — GUAIFENESIN 200 MG: 100 SOLUTION ORAL at 06:45

## 2020-03-20 RX ADMIN — LEVALBUTEROL HYDROCHLORIDE 0.63 MG: 0.63 SOLUTION RESPIRATORY (INHALATION) at 13:30

## 2020-03-20 RX ADMIN — CEFTRIAXONE SODIUM 1000 MG: 10 INJECTION, POWDER, FOR SOLUTION INTRAVENOUS at 22:02

## 2020-03-20 RX ADMIN — PRAVASTATIN SODIUM 40 MG: 40 TABLET ORAL at 17:00

## 2020-03-20 RX ADMIN — LOSARTAN POTASSIUM 100 MG: 50 TABLET, FILM COATED ORAL at 10:00

## 2020-03-20 RX ADMIN — DOCUSATE SODIUM 100 MG: 100 CAPSULE, LIQUID FILLED ORAL at 10:00

## 2020-03-20 RX ADMIN — GUAIFENESIN 200 MG: 100 SOLUTION ORAL at 11:18

## 2020-03-20 NOTE — H&P
H&P- Shima Francisco 1955, 77 y o  female MRN: [id number]    Unit/Bed#: -01 Encounter: [id number]    Primary Care Provider: Dariana Jimenez MD   Date and time admitted to hospital: 3/19/2020  8:31 PM         * Pneumonia  Assessment & Plan  Patient reports with 1 week of dry cough and congestion as well as headaches  Patient presented to urgent care on Monday, 3/16, and was diagnosed with left lingular pneumonia, and started on doxycycline, albuterol and prednisone  She reports no improvement of her symptoms  · Repeat CXR shows improving lingular pneumonia  · Patient denies any fevers or chills  She has no white blood cell count presently  · Flu/RSV negative  · Denies any recent travel or exposure to any persons with covid-19  No sick contacts whatsoever  Mild shortness of breath with coughing fits, but otherwise denies any trouble breathing  Currently saturating in mid 90s on room air  · Blood cultures pending, UA normal, lactic acid normal   Procalcitonin pending  · Sputum culture, urine legionella and urine strep pending  · Ceftriaxone and azithromycin started for CAP  · Continue neb treatments, Robitussin for cough    Type 2 diabetes mellitus without complication, without long-term current use of insulin ([facility] )  Assessment & Plan  No results found for: HGBA1C    No results for input(s): POCGLU in the last 72 hours  Blood Sugar Average: Last 72 hrs:    · On metformin at home    Will hold while inpatient  · SSI, frequent POC glucose checks  · Hypoglycemic protocol in place  · Low carb diet    Hyperlipidemia  Assessment & Plan  · On simvastatin at home, switch to pravastatin while inpatient    Essential hypertension  Assessment & Plan  · Continue home medications of amlodipine, carvedilol, doxazosin, losartan  · Monitoring per unit protocol  · BP stable at present    VTE Prophylaxis: Enoxaparin (Lovenox)  / sequential compression device   Code Status: full code  POLST: POLST is not applicable to this patient  Discussion with family: n/a    Anticipated Length of Stay:  Patient will be admitted on an Inpatient basis with an anticipated length of stay of  > 2 midnights  Justification for Hospital Stay: pt with pneumonia that failed outpatient treatment  Requiring IV antibiotics    Total Time for Visit, including Counseling / Coordination of Care: 1 hour  Greater than 50% of this total time spent on direct patient counseling and coordination of care  Chief Complaint:   "I've been coughing like crazy"    History of Present Illness:    Delonte Jones is a 77 y o  female with history of hypertension , hyperlipidemia , diabetes who presents with cough with clear sputum, congestion, headaches for 5 days  Patient reports her symptoms began on Saturday with a cough and congestion  She reports that on Monday, 3/16, she went to an urgent care and was diagnosed with lingular pneumonia, and started on doxycycline, albuterol and prednisone  She reports her symptoms have not improved since that time  She does report she has been having mild headache from all the coughing, as well as some rib pain with coughing  She denies any muscle aches  She denies any sore throat  She denies any abdominal pain, diarrhea, nausea, vomiting  She denies any recent sick contacts, or any recent travel  She reports she has been rarely leaving her house  She denies any fever or chills  She reports she has been having trouble sleeping at night as she has been coughing so much  She denies any urinary symptoms  She denies any decreased appetite  She does report she has been drinking less today than normal     Review of Systems:    Review of Systems   Constitutional: Positive for fatigue  Negative for activity change, chills and fever  HENT: Positive for congestion, postnasal drip and sinus pain  Negative for sore throat  Eyes: Negative for visual disturbance     Respiratory: Positive for cough, shortness of breath (with coughing fits) and wheezing  Negative for chest tightness  Cardiovascular: Negative for chest pain, palpitations and leg swelling  Gastrointestinal: Negative for abdominal pain, blood in stool, constipation, diarrhea, nausea and vomiting  Genitourinary: Negative for dysuria, flank pain and hematuria  Musculoskeletal: Negative for back pain and myalgias  Skin: Negative for rash  Neurological: Positive for headaches  Negative for dizziness and light-headedness  Hematological: Does not bruise/bleed easily  Psychiatric/Behavioral: Negative for behavioral problems  Past Medical and Surgical History:     Past Medical History:   Diagnosis Date    Diabetes mellitus ([facility] )     Hyperlipemia     Hypertension        Past Surgical History:   Procedure Laterality Date    TONSILLECTOMY      TUBAL LIGATION         Meds/Allergies:    Prior to Admission medications    Medication Sig Start Date End Date Taking? Authorizing Provider   Ascorbic Acid (VITAMIN C ER) 1000 MG TBCR ascorbic acid (vitamin C) 1,000 mg chewable tablet 2/23/17  Yes Historical Provider, MD   ASPIRIN 81 PO Take 1 tablet every day by oral route     Yes Historical Provider, MD   carvedilol (COREG) 6 25 mg tablet TAKE ONE TABLET BY MOUTH TWICE A DAY 4/8/19  Yes Historical Provider, MD   doxazosin (CARDURA) 1 mg tablet Take 1 mg by mouth daily at bedtime   Yes Historical Provider, MD   losartan (COZAAR) 100 MG tablet Take 100 mg by mouth daily   Yes Historical Provider, MD   metFORMIN (GLUCOPHAGE-XR) 500 mg 24 hr tablet 4 tablets once daily   Yes Historical Provider, MD   omega-3-acid ethyl esters (LOVAZA) 1 g capsule 1 tablet daily   Yes Historical Provider, MD   predniSONE 10 mg tablet 1 tab by mouth 3 times a day for 5 days 3/16/20  Yes Ramesh Faith MD   simvastatin (ZOCOR) 20 mg tablet 1 tablet daily   Yes Historical Provider, MD   albuterol (ProAir HFA) 90 mcg/act inhaler Inhale 2 puffs every 4 (four) hours as needed for wheezing or shortness of breath 3/16/20   Rere Schneider MD   amLODIPine (NORVASC) 5 mg tablet amlodipine 5 mg tablet    Historical Provider, MD   benzonatate (TESSALON PERLES) 100 mg capsule Take 1 capsule (100 mg total) by mouth 3 (three) times a day as needed for cough  Patient not taking: Reported on 3/19/2020 3/2/20   Kendra Guzman PA-C   benzonatate (TESSALON) 200 MG capsule Take 1 capsule (200 mg total) by mouth 3 (three) times a day as needed for cough 3/16/20   Rere Schneider MD   doxycycline (ADOXA) 100 MG tablet Take 1 tablet (100 mg total) by mouth 2 (two) times a day for 10 days 3/16/20 3/26/20  Rere Schneider MD   Multiple Vitamin (DAILY VALUE MULTIVITAMIN) TABS Take by mouth    Historical Provider, MD     I have reviewed home medications with patient personally  Allergies: Allergies   Allergen Reactions    Penicillins        Social History:     Marital Status: /Civil Union   Substance Use History:   Social History     Substance and Sexual Activity   Alcohol Use Yes    Frequency: Monthly or less     Social History     Tobacco Use   Smoking Status Former Smoker   Smokeless Tobacco Never Used     Social History     Substance and Sexual Activity   Drug Use Never       Family History:    non-contributory    Physical Exam:     Vitals:   Blood Pressure: 150/77 (03/19/20 2322)  Pulse: 87 (03/19/20 2322)  Temperature: 98 3 °F (36 8 °C) (03/19/20 2322)  Respirations: (!) 25 (03/19/20 2130)  Height: 5' 4" (162 6 cm) (03/19/20 2029)  Weight - Scale: 98 4 kg (217 lb) (03/19/20 2029)  SpO2: 93 % (03/19/20 2322)    Physical Exam   Constitutional: She appears well-developed and well-nourished  No distress  HENT:   Head: Normocephalic and atraumatic  Mouth/Throat: No oropharyngeal exudate  Eyes: Pupils are equal, round, and reactive to light  Neck: Normal range of motion  Neck supple  Cardiovascular: Normal rate and regular rhythm  No murmur heard    Pulmonary/Chest: Effort normal  She has decreased breath sounds (left lower lobe)  She has no wheezes  She has no rales  Abdominal: Soft  Bowel sounds are normal  There is no tenderness  Musculoskeletal: Normal range of motion  She exhibits no edema  Neurological: She is alert  Skin: Skin is warm and dry  She is not diaphoretic  Psychiatric: She has a normal mood and affect  Vitals reviewed  Additional Data:     Lab Results: I have personally reviewed pertinent reports  Results from last 7 days   Lab Units 03/19/20 2058   WBC Thousand/uL 6 79   HEMOGLOBIN g/dL 13 6   HEMATOCRIT % 42 5   PLATELETS Thousands/uL 308   NEUTROS PCT % 63   LYMPHS PCT % 25   MONOS PCT % 11   EOS PCT % 0     Results from last 7 days   Lab Units 03/19/20 2057   SODIUM mmol/L 146*   POTASSIUM mmol/L 4 2   CHLORIDE mmol/L 105   CO2 mmol/L 30   BUN mg/dL 25   CREATININE mg/dL 0 72   ANION GAP mmol/L 11   CALCIUM mg/dL 9 8   ALBUMIN g/dL 4 0   TOTAL BILIRUBIN mg/dL 0 30   ALK PHOS U/L 118*   ALT U/L 60   AST U/L 55*   GLUCOSE RANDOM mg/dL 113     Results from last 7 days   Lab Units 03/19/20  2057   INR  0 99     Results from last 7 days   Lab Units 03/19/20  2341   POC GLUCOSE mg/dl 117         Results from last 7 days   Lab Units 03/19/20  2256 03/19/20  2100   LACTIC ACID mmol/L 0 8 0 9       Imaging: I have personally reviewed pertinent reports  No orders to display       EKG, Pathology, and Other Studies Reviewed on Admission:   · EKG: normal sinus rhythm  No ST segment changes    Allscripts / Epic Records Reviewed: Yes     ** Please Note: This note has been constructed using a voice recognition system   **

## 2020-03-20 NOTE — ASSESSMENT & PLAN NOTE
No results found for: HGBA1C    Recent Labs     03/19/20  2341 03/20/20  0547   POCGLU 117 98       Blood Sugar Average: Last 72 hrs:  (P) 107 5  · On metformin at home    Will hold while inpatient  · SSI, frequent POC glucose checks  · Hypoglycemic protocol in place  · Low carb diet

## 2020-03-20 NOTE — ASSESSMENT & PLAN NOTE
X-ray demonstrated left lingular pneumonia    Patient also has significant bilateral wheezing and rhonchi on exam  Presumed tracheobronchitis  Will add steroids scheduled breathing treatments and respiratory protocol  Continue serial exams    It appears when she is sitting her oxygen sats between 88-90%  Will provide supplemental oxygen as needed  Will need home oxygen evaluation prior to discharge

## 2020-03-20 NOTE — ASSESSMENT & PLAN NOTE
Patient reports with 1 week of dry cough and congestion as well as headaches  Patient presented to urgent care on Monday, 3/16, and was diagnosed with left lingular pneumonia, and started on doxycycline, albuterol and prednisone  She reports no improvement of her symptoms  · Repeat CXR shows improving lingular pneumonia  · Patient denies any fevers or chills  She has no white blood cell count presently  · Flu/RSV negative  · Denies any recent travel or exposure to any persons with covid-19  No sick contacts whatsoever  Mild shortness of breath with coughing fits, but otherwise denies any trouble breathing  Currently saturating in mid 90s on room air    · Blood cultures pending, UA normal, lactic acid normal   Procalcitonin pending  · Sputum culture pending, urine legionella and urine strep negative  · Ceftriaxone and azithromycin started for CAP  · Continue neb treatments, Robitussin for cough  · Will add low-dose prednisone for presumed tracheobronchitis

## 2020-03-20 NOTE — ASSESSMENT & PLAN NOTE
Patient reports with 1 week of dry cough and congestion as well as headaches  Patient presented to urgent care on Monday, 3/16, and was diagnosed with left lingular pneumonia, and started on doxycycline, albuterol and prednisone  She reports no improvement of her symptoms  · Repeat CXR shows improving lingular pneumonia  · Patient denies any fevers or chills  She has no white blood cell count presently  · Flu/RSV negative  · Denies any recent travel or exposure to any persons with covid-19  No sick contacts whatsoever  Mild shortness of breath with coughing fits, but otherwise denies any trouble breathing  Currently saturating in mid 90s on room air    · Blood cultures pending, UA normal, lactic acid normal   Procalcitonin pending  · Sputum culture, urine legionella and urine strep pending  · Ceftriaxone and azithromycin started for CAP  · Continue neb treatments, Robitussin for cough

## 2020-03-20 NOTE — PLAN OF CARE
Problem: Potential for Falls  Goal: Patient will remain free of falls  Description  INTERVENTIONS:  - Assess patient frequently for physical needs  -  Identify cognitive and physical deficits and behaviors that affect risk of falls    -  [place] fall precautions as indicated by assessment   - Educate patient/family on patient safety including physical limitations  - Instruct patient to call for assistance with activity based on assessment  - Modify environment to reduce risk of injury  - Consider OT/PT consult to assist with strengthening/mobility  Outcome: Progressing     Problem: RESPIRATORY - ADULT  Goal: Achieves optimal ventilation and oxygenation  Description  INTERVENTIONS:  - Assess for changes in respiratory status  - Assess for changes in mentation and behavior  - Position to facilitate oxygenation and minimize respiratory effort  - Oxygen administered by appropriate delivery if ordered  - Initiate smoking cessation education as indicated  - Encourage broncho-pulmonary hygiene including cough, deep breathe, Incentive Spirometry  - Assess the need for suctioning and aspirate as needed  - Assess and instruct to report SOB or any respiratory difficulty  - Respiratory Therapy support as indicated  Outcome: Progressing     Problem: INFECTION - ADULT  Goal: Absence or prevention of progression during hospitalization  Description  INTERVENTIONS:  - Assess and monitor for signs and symptoms of infection  - Monitor lab/diagnostic results  - Monitor all insertion sites, i e  indwelling lines, tubes, and drains  - Monitor endotracheal if appropriate and nasal secretions for changes in amount and color  - [place] appropriate cooling/warming therapies per order  - Administer medications as ordered  - Instruct and encourage patient and family to use good hand hygiene technique  - Identify and instruct in appropriate isolation precautions for identified infection/condition  Outcome: Progressing     Problem: SAFETY ADULT  Goal: Maintain or return mobility status to optimal level  Description  INTERVENTIONS:  - Assess patient's baseline mobility status (ambulation, transfers, stairs, etc )    - Identify cognitive and physical deficits and behaviors that affect mobility  - Identify mobility aids required to assist with transfers and/or ambulation (gait belt, sit-to-stand, lift, walker, cane, etc )  - [place] fall precautions as indicated by assessment  - Record patient progress and toleration of activity level on Mobility SBAR; progress patient to next Phase/Stage  - Instruct patient to call for assistance with activity based on assessment  - Consider rehabilitation consult to assist with strengthening/weightbearing, etc   Outcome: Progressing     Problem: DISCHARGE PLANNING  Goal: Discharge to home or other facility with appropriate resources  Description  INTERVENTIONS:  - Identify barriers to discharge w/patient and caregiver  - Arrange for needed discharge resources and transportation as appropriate  - Identify discharge learning needs (meds, wound care, etc )  - Arrange for interpretive services to assist at discharge as needed  - Refer to Case Management Department for coordinating discharge planning if the patient needs post-hospital services based on physician/advanced practitioner order or complex needs related to functional status, cognitive ability, or social support system  Outcome: Progressing

## 2020-03-20 NOTE — RESPIRATORY THERAPY NOTE
RT Protocol Note  Shima Francisco 77 y o  female MRN: [id number]  Unit/Bed#: -01 Encounter: [id number]    Assessment    Principal Problem:    Pneumonia  Active Problems:    Essential hypertension    Hyperlipidemia    Type 2 diabetes mellitus without complication, without long-term current use of insulin (HCC)      Home Pulmonary Medications:  Albuterol inhaler prn       Past Medical History:   Diagnosis Date    Diabetes mellitus ([address] )     Hyperlipemia     Hypertension      Social History     Socioeconomic History    Marital status: /Civil Union     Spouse name: None    Number of children: None    Years of education: None    Highest education level: None   Occupational History    None   Social Needs    Financial resource strain: None    Food insecurity:     Worry: None     Inability: None    Transportation needs:     Medical: None     Non-medical: None   Tobacco Use    Smoking status: Former Smoker    Smokeless tobacco: Never Used   Substance and Sexual Activity    Alcohol use: Yes     Frequency: Monthly or less    Drug use: Never    Sexual activity: None   Lifestyle    Physical activity:     Days per week: None     Minutes per session: None    Stress: None   Relationships    Social connections:     Talks on phone: None     Gets together: None     Attends Buddhist service: None     Active member of club or organization: None     Attends meetings of clubs or organizations: None     Relationship status: None    Intimate partner violence:     Fear of current or ex partner: None     Emotionally abused: None     Physically abused: None     Forced sexual activity: None   Other Topics Concern    None   Social History Narrative    None       Subjective         Objective    Physical Exam:   Assessment Type: (P) Assess only  General Appearance: (P) Alert, Awake  Respiratory Pattern: (P) Normal  Chest Assessment: (P) Chest expansion symmetrical  Bilateral Breath Sounds: (P) Diminished  Cough: (P) Productive, Strong    Vitals:  Blood pressure 150/77, pulse 87, temperature 98 3 °F (36 8 °C), resp  rate (!) 25, height 5' 4" (1 626 m), weight 98 4 kg (217 lb), SpO2 93 %  Imaging and other studies: I have personally reviewed pertinent reports  Plan    Respiratory Plan: (P) No distress/Pulmonary history        Resp Comments: (P) Protocol initiated  Pt  recently given ProAir prn for SOB  Pt  being treated for pneumonia  Currently breathing is not labored and no SOB present  Pt  on room air saturation of 95%  Given albuterol inhaler q4prn

## 2020-03-20 NOTE — ED PROVIDER NOTES
History  Chief Complaint   Patient presents with    Cough     Patient reports she was seen at Urgent Care on Monday and today  Patient reports cough and congestion  Patient reports sinus headache from coughing  76 y/o female presents to the ED for cough x 6 days  Patient reports that she was seen 4 days ago and diagnosed with pneumonia  She was placed on doxycycline, albuterol, and prednisone without any relief  She states she returned today due to no improvement with medications  She states that she was sent here due to failure of outpatient therapy  She denies any fevers, chest pain, abdominal pain, nausea/vomiting, diarrhea/constipation, or urinary symptoms  Denies any known sick contacts  Denies any recent travel  Denies any known exposure to anyone who is tested positive or is being tested for COVID 19  History provided by:  Patient  Cough   Cough characteristics:  Non-productive  Severity:  Moderate  Onset quality:  Sudden  Timing:  Constant  Progression:  Worsening  Chronicity:  New  Smoker: no    Context: not sick contacts    Relieved by:  None tried  Worsened by:  Nothing  Ineffective treatments:  None tried  Associated symptoms: shortness of breath    Associated symptoms: no chest pain, no chills, no ear pain, no fever, no headaches, no rash, no sore throat and no wheezing        Prior to Admission Medications   Prescriptions Last Dose Informant Patient Reported? Taking? ASPIRIN 81 PO   Yes No   Sig: Take 1 tablet every day by oral route     Ascorbic Acid (VITAMIN C ER) 1000 MG TBCR   Yes No   Sig: ascorbic acid (vitamin C) 1,000 mg chewable tablet   Multiple Vitamin (DAILY VALUE MULTIVITAMIN) TABS   Yes No   Sig: Take by mouth   albuterol (ProAir HFA) 90 mcg/act inhaler   No No   Sig: Inhale 2 puffs every 4 (four) hours as needed for wheezing or shortness of breath   amLODIPine (NORVASC) 5 mg tablet   Yes No   Sig: amlodipine 5 mg tablet   benzonatate (TESSALON PERLES) 100 mg capsule No No   Sig: Take 1 capsule (100 mg total) by mouth 3 (three) times a day as needed for cough   Patient not taking: Reported on 3/19/2020   benzonatate (TESSALON) 200 MG capsule   No No   Sig: Take 1 capsule (200 mg total) by mouth 3 (three) times a day as needed for cough   carvedilol (COREG) 6 25 mg tablet   Yes No   Sig: TAKE ONE TABLET BY MOUTH TWICE A DAY   doxazosin (CARDURA) 1 mg tablet   Yes No   Sig: Take 1 mg by mouth daily at bedtime   doxycycline (ADOXA) 100 MG tablet   No No   Sig: Take 1 tablet (100 mg total) by mouth 2 (two) times a day for 10 days   losartan (COZAAR) 100 MG tablet   Yes No   Sig: Take 100 mg by mouth daily   metFORMIN (GLUCOPHAGE-XR) 500 mg 24 hr tablet   Yes No   Si tablets once daily   omega-3-acid ethyl esters (LOVAZA) 1 g capsule   Yes No   Si tablet daily   predniSONE 10 mg tablet   No No   Si tab by mouth 3 times a day for 5 days   simvastatin (ZOCOR) 20 mg tablet   Yes No   Si tablet daily      Facility-Administered Medications: None       Past Medical History:   Diagnosis Date    Diabetes mellitus ([facility] [address] )     Hyperlipemia     Hypertension        Past Surgical History:   Procedure Laterality Date    TONSILLECTOMY      TUBAL LIGATION         Family History   Problem Relation Age of Onset    Hypertension Mother     Hypertension Father     Cancer Father     Heart disease Father      I have reviewed and agree with the history as documented  E-Cigarette/Vaping    E-Cigarette Use Never User      E-Cigarette/Vaping Substances    Nicotine No     THC No     CBD No     Flavoring No      Social History     Tobacco Use    Smoking status: Former Smoker    Smokeless tobacco: Never Used   Substance Use Topics    Alcohol use: Not Currently     Frequency: Monthly or less    Drug use: Never       Review of Systems   Constitutional: Negative for chills and fever  HENT: Negative for congestion, ear pain and sore throat      Eyes: Negative for pain and visual disturbance  Respiratory: Positive for cough and shortness of breath  Negative for wheezing  Cardiovascular: Negative for chest pain and leg swelling  Gastrointestinal: Negative for abdominal pain, diarrhea, nausea and vomiting  Genitourinary: Negative for dysuria, frequency, hematuria and urgency  Musculoskeletal: Negative for neck pain and neck stiffness  Skin: Negative for rash and wound  Neurological: Negative for weakness, numbness and headaches  Psychiatric/Behavioral: Negative for agitation and confusion  All other systems reviewed and are negative  Physical Exam  Physical Exam   Constitutional: She is oriented to person, place, and time  She appears well-developed and well-nourished  HENT:   Head: Normocephalic and atraumatic  Mouth/Throat: Oropharynx is clear and moist    Eyes: Pupils are equal, round, and reactive to light  EOM are normal    Neck: Normal range of motion  Neck supple  Cardiovascular: Normal rate and regular rhythm  Pulmonary/Chest: Effort normal  No stridor  No respiratory distress  She has wheezes in the left lower field  She has rhonchi in the left lower field  She has no rales  Abdominal: Soft  Bowel sounds are normal  She exhibits no distension  There is no tenderness  Musculoskeletal: Normal range of motion  Neurological: She is alert and oriented to person, place, and time  No focal deficits   Skin: Skin is warm and dry  Nursing note and vitals reviewed        Vital Signs  ED Triage Vitals [03/19/20 2029]   Temperature Pulse Respirations Blood Pressure SpO2   99 4 °F (37 4 °C) 92 18 (!) 181/86 92 %      Temp src Heart Rate Source Patient Position - Orthostatic VS BP Location FiO2 (%)   -- Monitor Lying Right arm --      Pain Score       --           Vitals:    03/19/20 2029 03/19/20 2045 03/19/20 2130   BP: (!) 181/86 117/62 128/69   Pulse: 92 88 80   Patient Position - Orthostatic VS: Lying Lying Lying         Visual Acuity      ED Medications  Medications   sodium chloride 0 9 % bolus 1,000 mL (1,000 mL Intravenous New Bag 3/19/20 2110)   azithromycin (ZITHROMAX) 500 mg in sodium chloride 0 9% 250mL IVPB 500 mg (500 mg Intravenous New Bag 3/19/20 2214)   albuterol inhalation solution 5 mg (5 mg Nebulization Given 3/19/20 2107)   ipratropium (ATROVENT) 0 02 % inhalation solution 0 5 mg (0 5 mg Nebulization Given 3/19/20 2107)   ceftriaxone (ROCEPHIN) 1 g/50 mL in dextrose IVPB (0 mg Intravenous Stopped 3/19/20 2149)       Diagnostic Studies  Results Reviewed     Procedure Component Value Units Date/Time    Troponin I [[id number]]  (Normal) Collected:  03/19/20 2153    Lab Status:  Final result Specimen:  Blood from Arm, Right Updated:  03/19/20 2218     Troponin I <0 02 ng/mL     Influenza A/B and RSV PCR [[id number]]  (Normal) Collected:  03/19/20 2103    Lab Status:  Final result Specimen:  Nasopharyngeal Swab Updated:  03/19/20 2148     INFLUENZA A PCR None Detected     INFLUENZA B PCR None Detected     RSV PCR None Detected    Lactic acid x2 [[id number]]  (Normal) Collected:  03/19/20 2100    Lab Status:  Final result Specimen:  Blood from Arm, Right Updated:  03/19/20 2130     LACTIC ACID 0 9 mmol/L     Narrative:       Result may be elevated if tourniquet was used during collection      Comprehensive metabolic panel [[id number]]  (Abnormal) Collected:  03/19/20 2057    Lab Status:  Final result Specimen:  Blood from Arm, Right Updated:  03/19/20 2127     Sodium 146 mmol/L      Potassium 4 2 mmol/L      Chloride 105 mmol/L      CO2 30 mmol/L      ANION GAP 11 mmol/L      BUN 25 mg/dL      Creatinine 0 72 mg/dL      Glucose 113 mg/dL      Calcium 9 8 mg/dL      AST 55 U/L      ALT 60 U/L      Alkaline Phosphatase 118 U/L      Total Protein 7 7 g/dL      Albumin 4 0 g/dL      Total Bilirubin 0 30 mg/dL      eGFR 87 ml/min/1 73sq m     Narrative:       [place] guidelines for Chronic Kidney Disease (CKD):     Stage 1 with normal or high GFR (GFR > 90 mL/min/1 73 square meters)    Stage 2 Mild CKD (GFR = 60-89 mL/min/1 73 square meters)    Stage 3A Moderate CKD (GFR = 45-59 mL/min/1 73 square meters)    Stage 3B Moderate CKD (GFR = 30-44 mL/min/1 73 square meters)    Stage 4 Severe CKD (GFR = 15-29 mL/min/1 73 square meters)    Stage 5 End Stage CKD (GFR <15 mL/min/1 73 square meters)  Note: GFR calculation is accurate only with a steady state creatinine    Urine Microscopic [[id number]]  (Abnormal) Collected:  03/19/20 2056    Lab Status:  Final result Specimen:  Urine, Clean Catch Updated:  03/19/20 2124     RBC, UA 30-50 /hpf      WBC, UA 0-1 /hpf      Epithelial Cells Occasional /hpf      Bacteria, UA Occasional /hpf      Uric Acid Tanisha, UA Moderate /hpf     Protime-INR [[id number]]  (Normal) Collected:  03/19/20 2057    Lab Status:  Final result Specimen:  Blood from Arm, Right Updated:  03/19/20 2124     Protime 13 1 seconds      INR 0 99    APTT [[id number]]  (Normal) Collected:  03/19/20 2057    Lab Status:  Final result Specimen:  Blood from Arm, Right Updated:  03/19/20 2124     PTT 33 seconds     UA w Reflex to Microscopic w Reflex to Culture [[id number]]  (Abnormal) Collected:  03/19/20 2056    Lab Status:  Final result Specimen:  Urine, Clean Catch Updated:  03/19/20 2117     Color, UA Stephanie     Clarity, UA Cloudy     Specific Gravity, UA >=1 030     pH, UA 5 5     Leukocytes, UA Negative     Nitrite, UA Negative     Protein,  (2+) mg/dl      Glucose, UA Negative mg/dl      Ketones, UA Negative mg/dl      Urobilinogen, UA 0 2 E U /dl      Bilirubin, UA Negative     Blood, UA Large    CBC and differential [[id number]] Collected:  03/19/20 2058    Lab Status:  Final result Specimen:  Blood from Arm, Right Updated:  03/19/20 2111     WBC 6 79 Thousand/uL      RBC 4 34 Million/uL      Hemoglobin 13 6 g/dL      Hematocrit 42 5 %      MCV 98 fL      MCH 31 3 pg      MCHC 32 0 g/dL      RDW 14 6 %      MPV 10 1 fL Platelets 236 Thousands/uL      nRBC 0 /100 WBCs      Neutrophils Relative 63 %      Immat GRANS % 1 %      Lymphocytes Relative 25 %      Monocytes Relative 11 %      Eosinophils Relative 0 %      Basophils Relative 0 %      Neutrophils Absolute 4 28 Thousands/µL      Immature Grans Absolute 0 04 Thousand/uL      Lymphocytes Absolute 1 71 Thousands/µL      Monocytes Absolute 0 73 Thousand/µL      Eosinophils Absolute 0 01 Thousand/µL      Basophils Absolute 0 02 Thousands/µL     Blood culture #1 [[id number]] Collected:  03/19/20 2106    Lab Status: In process Specimen:  Blood from Hand, Left Updated:  03/19/20 2110    Procalcitonin [[id number]] Collected:  03/19/20 2057    Lab Status: In process Specimen:  Blood from Arm, Right Updated:  03/19/20 2107    Blood culture #2 [[id number]] Collected:  03/19/20 2100    Lab Status: In process Specimen:  Blood from Arm, Right Updated:  03/19/20 2107    Lactic acid x2 [[id number]]     Lab Status:  No result Specimen:  Blood                  No orders to display              Procedures  ECG 12 Lead Documentation Only  Date/Time: 3/19/2020 9:19 PM  Performed by: Keon Stanton DO  Authorized by: Keon Stanton DO     Indications / Diagnosis:  Sob   Patient location:  ED  Previous ECG:     Previous ECG:  Unavailable  Rate:     ECG rate:  84    ECG rate assessment: normal    Rhythm:     Rhythm: sinus rhythm    Ectopy:     Ectopy: none    QRS:     QRS axis:  Normal    QRS intervals:  Normal  ST segments:     ST segments:  Normal  T waves:     T waves: normal               ED Course                                 MDM  Number of Diagnoses or Management Options  Lingular pneumonia: new and requires workup  Diagnosis management comments: Patient with pneumonia- failed outpatient treatment- will get labs, lactic, blood cultures, and procal- will admit to the hospital for IV antibiotics     Patient reevaluated and feels improved   Patient updated on results of tests and plan of care including admission to hospital for further evaluation of presenting complaint  Patient demonstrates verbal understanding and agrees with plan  [facility] to [facility] with [facility] for continuation of patient care  Amount and/or Complexity of Data Reviewed  Clinical lab tests: ordered and reviewed  Tests in the radiology section of CPT®: ordered and reviewed  Tests in the medicine section of CPT®: ordered and reviewed  Discussion of test results with the performing providers: yes  Decide to obtain previous medical records or to obtain history from someone other than the patient: yes  Obtain history from someone other than the patient: yes  Review and summarize past medical records: yes  Discuss the patient with other providers: yes  Independent visualization of images, tracings, or specimens: yes    Patient Progress  Patient progress: improved        Disposition  Final diagnoses:   Lingular pneumonia     Time reflects when diagnosis was documented in both MDM as applicable and the Disposition within this note     Time User Action Codes Description Comment    3/19/2020  9:50 PM Harmeet Coffey Add [J18 [id number]] Lingular pneumonia       ED Disposition     ED Disposition Condition Date/Time Comment    Admit Stable Thu Mar 19, 2020  9:51 PM Case was discussed with HENRIQUE and the patient's admission status was agreed to be Admission Status: inpatient status to the service of Dr Shellye Severance  Follow-up Information    None         Patient's Medications   Discharge Prescriptions    No medications on file     No discharge procedures on file      PDMP Review     None          ED Provider  Electronically Signed by           Mar Redd DO  03/19/20 8068

## 2020-03-20 NOTE — NURSING NOTE
Patient started on 2L of oxygen as per dr Jasmyne Mckeon  Patient oxygen dropped to 88-89  On assessment patient presented with wheezes and rhonchi  Patient O2 improved after administration of oxygen  Will continue to monitor

## 2020-03-20 NOTE — ASSESSMENT & PLAN NOTE
· Continue home medications of amlodipine, carvedilol, doxazosin, losartan  · Monitoring per unit protocol  · BP stable at present

## 2020-03-20 NOTE — PROGRESS NOTES
Progress Note - Shima Francisco 1955, 77 y o  female MRN: [id number]    Unit/Bed#: -01 Encounter: [id number]    Primary Care Provider: Lalo Thomas MD   Date and time admitted to hospital: 3/19/2020  8:31 PM        * Community-acquired pneumonia  Assessment & Plan  Patient reports with 1 week of dry cough and congestion as well as headaches  Patient presented to urgent care on Monday, 3/16, and was diagnosed with left lingular pneumonia, and started on doxycycline, albuterol and prednisone  She reports no improvement of her symptoms  · Repeat CXR shows improving lingular pneumonia  · Patient denies any fevers or chills  She has no white blood cell count presently  · Flu/RSV negative  · Denies any recent travel or exposure to any persons with covid-19  No sick contacts whatsoever  Mild shortness of breath with coughing fits, but otherwise denies any trouble breathing  Currently saturating in mid 90s on room air  · Blood cultures pending, UA normal, lactic acid normal   Procalcitonin pending  · Sputum culture pending, urine legionella and urine strep negative  · Ceftriaxone and azithromycin started for CAP  · Continue neb treatments, Robitussin for cough  · Will add low-dose prednisone for presumed tracheobronchitis    Acute tracheobronchitis  Assessment & Plan  X-ray demonstrated left lingular pneumonia    Patient also has significant bilateral wheezing and rhonchi on exam  Presumed tracheobronchitis  Will add steroids scheduled breathing treatments and respiratory protocol  Continue serial exams    It appears when she is sitting her oxygen sats between 88-90%  Will provide supplemental oxygen as needed  Will need home oxygen evaluation prior to discharge    Hypernatremia  Assessment & Plan  Likely secondary to dehydration  Resolved with fluid hydration    Type 2 diabetes mellitus without complication, without long-term current use of insulin ([facility] [address] )  Assessment & Plan  No results found for: HGBA1C    Recent Labs     20  2341 20  0547   POCGLU 117 98       Blood Sugar Average: Last 72 hrs:  (P) 107 5  · On metformin at home  Will hold while inpatient  · SSI, frequent POC glucose checks  · Hypoglycemic protocol in place  · Low carb diet    Hyperlipidemia  Assessment & Plan  · On simvastatin at home, switch to pravastatin while inpatient    Essential hypertension  Assessment & Plan  · Continue home medications of amlodipine, carvedilol, doxazosin, losartan  · Monitoring per unit protocol  · BP stable at present    VTE Pharmacologic Prophylaxis:   Pharmacologic: Enoxaparin (Lovenox)  Mechanical VTE Prophylaxis in Place: Yes    Patient Centered Rounds: I have performed bedside rounds with nursing staff today  Discussions with Specialists or Other Care Team Provider:  Case management    Education and Discussions with Family / Patient patient    Time Spent for Care: 30 minutes  More than 50% of total time spent on counseling and coordination of care as described above  Current Length of Stay: 1 day(s)    Current Patient Status: Inpatient   Certification Statement: The patient will continue to require additional inpatient hospital stay due to Above medical problems    Discharge Plan:  Continue hospitalization at least for next 24 hours  Code Status: Level 1 - Full Code      Subjective:   Patient seen and examined  No acute complaints at this time  Continues to have significant cough  No shortness of breath  No chest pain  No fevers or chills  Overall slightly better than yesterday  No nausea vomiting or diarrhea  Tolerating antibiotics well  Objective:     Vitals:   Temp (24hrs), Av 6 °F (37 °C), Min:98 °F (36 7 °C), Max:99 4 °F (37 4 °C)    Temp:  [98 °F (36 7 °C)-99 4 °F (37 4 °C)] 98 °F (36 7 °C)  HR:  [80-92] 86  Resp:  [18-25] 25  BP: (117-181)/(60-86) 145/73  SpO2:  [89 %-97 %] 92 %  Body mass index is 37 25 kg/m²       Input and Output Summary (last 24 hours): Intake/Output Summary (Last 24 hours) at 3/20/2020 1026  Last data filed at 3/20/2020 0900  Gross per 24 hour   Intake 2236 25 ml   Output    Net 2236 25 ml       Physical Exam:     Physical Exam   Constitutional: She is oriented to person, place, and time  She appears well-developed and well-nourished  HENT:   Head: Normocephalic and atraumatic  Eyes: Pupils are equal, round, and reactive to light  EOM are normal    Neck: Normal range of motion  Neck supple  Cardiovascular: Normal rate and regular rhythm  Pulmonary/Chest: Effort normal  She has wheezes  She has rales  Abdominal: Soft  Bowel sounds are normal  There is no tenderness  Musculoskeletal: Normal range of motion  Neurological: She is alert and oriented to person, place, and time  Skin: Skin is warm and dry       Additional Data:     Labs:    Results from last 7 days   Lab Units 03/20/20 0526 03/19/20 2058   WBC Thousand/uL 5 92 6 79   HEMOGLOBIN g/dL 11 9 13 6   HEMATOCRIT % 38 8 42 5   PLATELETS Thousands/uL 254 308   BANDS PCT % 5  --    NEUTROS PCT %  --  63   LYMPHS PCT %  --  25   LYMPHO PCT % 22  --    MONOS PCT %  --  11   MONO PCT % 15*  --    EOS PCT % 0 0     Results from last 7 days   Lab Units 03/20/20 0526 03/19/20 2057   SODIUM mmol/L 144 146*   POTASSIUM mmol/L 3 3* 4 2   CHLORIDE mmol/L 107 105   CO2 mmol/L 28 30   BUN mg/dL 21 25   CREATININE mg/dL 0 64 0 72   ANION GAP mmol/L 9 11   CALCIUM mg/dL 8 8 9 8   ALBUMIN g/dL  --  4 0   TOTAL BILIRUBIN mg/dL  --  0 30   ALK PHOS U/L  --  118*   ALT U/L  --  60   AST U/L  --  55*   GLUCOSE RANDOM mg/dL 104 113     Results from last 7 days   Lab Units 03/19/20 2057   INR  0 99     Results from last 7 days   Lab Units 03/20/20  0547 03/19/20  2341   POC GLUCOSE mg/dl 98 117         Results from last 7 days   Lab Units 03/20/20  0526 03/19/20  2256 03/19/20  2100 03/19/20 2057   LACTIC ACID mmol/L  --  0 8 0 9  --    PROCALCITONIN ng/ml <0 05  --   --  <0 05           * I Have Reviewed All Lab Data Listed Above  * Additional Pertinent Lab Tests Reviewed: Jenna 66 Admission Reviewed    Imaging:    Imaging Reports Reviewed Today Include:   Imaging Personally Reviewed by Myself Includes:  cxr    Recent Cultures (last 7 days):     Results from last 7 days   Lab Units 03/20/20  0100 03/19/20  2106 03/19/20  2100   BLOOD CULTURE   --  Received in Microbiology Lab  Culture in Progress  Received in Microbiology Lab  Culture in Progress  LEGIONELLA URINARY ANTIGEN  Negative  --   --        Last 24 Hours Medication List:     Current Facility-Administered Medications:  acetaminophen 650 mg Oral Q6H PRN Brittany Swartz PA-C   albuterol 2 puff Inhalation Q4H PRN Dior Guido MD   amLODIPine 5 mg Oral Daily Brittany Swartz PA-C   aspirin 81 mg Oral Daily Brittany Swartz PA-C   cefTRIAXone 1,000 mg Intravenous Q24H Brittany Swartz PA-C   And       azithromycin 500 mg Intravenous Q24H Brittany Swartz PA-C   carvedilol 6 25 mg Oral BID Brittany Swartz PA-C   docusate sodium 100 mg Oral BID Brittany Swartz PA-C   doxazosin 1 mg Oral HS Brittany Swartz PA-C   enoxaparin 40 mg Subcutaneous Daily Brittany Swartz PA-C   guaiFENesin 200 mg Oral Q4H Divina Daigle PA-C   insulin lispro 1-5 Units Subcutaneous TID AC Divina Henderson PA-C   losartan 100 mg Oral Daily Brittany Swartz PA-C   ondansetron 4 mg Intravenous Q6H PRN Brittany Swratz PA-C   pravastatin 40 mg Oral Daily With Dinner Brittany Swartz PA-C        Today, Patient Was Seen By: Franck Traore MD    ** Please Note: Dictation voice to text software may have been used in the creation of this document   **

## 2020-03-20 NOTE — ED NOTES
Admitting at bedside       Rosmery Rae, [facility]  03/19/20 4877

## 2020-03-20 NOTE — ED NOTES
Trauma: n/a    1  CC: SOB, cough, low O2 sat, sent from urgent care after failure to improve on steroids and antibiotics    2  Admission r/t injury? n/a    3  Orientation Status: A/O x4    4  Abnormal labs/abnormal focused assessment/abnormal vitals  Pneumonia, low 90's O2 saturation on room air, non productive cough, ex/insp wheezes, dyspnea on exertion and at rest    5  Medications/ drips  x2 Iv antibiotics, 1L fluid bolus    6  Last time narcotics given/pain meds  N/a    7  IV lines/drains/etc   20 RAC  8  Isolation status  N/a, patient wearing mask for safety  9  Skin  intact  10  Ambulation status  Independent   11   ED phone number   [address] [facility]  03/19/20 2260

## 2020-03-20 NOTE — ASSESSMENT & PLAN NOTE
No results found for: HGBA1C    No results for input(s): POCGLU in the last 72 hours  Blood Sugar Average: Last 72 hrs:    · On metformin at home    Will hold while inpatient  · SSI, frequent POC glucose checks  · Hypoglycemic protocol in place  · Low carb diet

## 2020-03-21 LAB
GLUCOSE SERPL-MCNC: 103 MG/DL (ref 65–140)
GLUCOSE SERPL-MCNC: 108 MG/DL (ref 65–140)
GLUCOSE SERPL-MCNC: 142 MG/DL (ref 65–140)
GLUCOSE SERPL-MCNC: 154 MG/DL (ref 65–140)

## 2020-03-21 PROCEDURE — 87205 SMEAR GRAM STAIN: CPT | Performed by: PHYSICIAN ASSISTANT

## 2020-03-21 PROCEDURE — 94640 AIRWAY INHALATION TREATMENT: CPT

## 2020-03-21 PROCEDURE — 82948 REAGENT STRIP/BLOOD GLUCOSE: CPT

## 2020-03-21 PROCEDURE — 94760 N-INVAS EAR/PLS OXIMETRY 1: CPT

## 2020-03-21 PROCEDURE — 99233 SBSQ HOSP IP/OBS HIGH 50: CPT | Performed by: INTERNAL MEDICINE

## 2020-03-21 RX ADMIN — LEVALBUTEROL HYDROCHLORIDE 0.63 MG: 0.63 SOLUTION RESPIRATORY (INHALATION) at 08:53

## 2020-03-21 RX ADMIN — CEFTRIAXONE SODIUM 1000 MG: 10 INJECTION, POWDER, FOR SOLUTION INTRAVENOUS at 20:13

## 2020-03-21 RX ADMIN — AZITHROMYCIN MONOHYDRATE 500 MG: 500 INJECTION, POWDER, LYOPHILIZED, FOR SOLUTION INTRAVENOUS at 21:32

## 2020-03-21 RX ADMIN — PREDNISONE 40 MG: 20 TABLET ORAL at 09:13

## 2020-03-21 RX ADMIN — INSULIN LISPRO 1 UNITS: 100 INJECTION, SOLUTION INTRAVENOUS; SUBCUTANEOUS at 16:25

## 2020-03-21 RX ADMIN — DOCUSATE SODIUM 100 MG: 100 CAPSULE, LIQUID FILLED ORAL at 09:12

## 2020-03-21 RX ADMIN — HYDROCODONE BITARTRATE AND HOMATROPINE METHYLBROMIDE 5 ML: 5; 1.5 SOLUTION ORAL at 01:55

## 2020-03-21 RX ADMIN — DOXAZOSIN 1 MG: 1 TABLET ORAL at 21:32

## 2020-03-21 RX ADMIN — ASPIRIN 81 MG 81 MG: 81 TABLET ORAL at 09:13

## 2020-03-21 RX ADMIN — LEVALBUTEROL HYDROCHLORIDE 0.63 MG: 0.63 SOLUTION RESPIRATORY (INHALATION) at 13:44

## 2020-03-21 RX ADMIN — PRAVASTATIN SODIUM 40 MG: 40 TABLET ORAL at 16:26

## 2020-03-21 RX ADMIN — LOSARTAN POTASSIUM 100 MG: 50 TABLET, FILM COATED ORAL at 09:13

## 2020-03-21 RX ADMIN — CARVEDILOL 6.25 MG: 6.25 TABLET, FILM COATED ORAL at 09:13

## 2020-03-21 RX ADMIN — LEVALBUTEROL HYDROCHLORIDE 0.63 MG: 0.63 SOLUTION RESPIRATORY (INHALATION) at 19:50

## 2020-03-21 RX ADMIN — ENOXAPARIN SODIUM 40 MG: 40 INJECTION SUBCUTANEOUS at 09:17

## 2020-03-21 RX ADMIN — AMLODIPINE BESYLATE 5 MG: 5 TABLET ORAL at 09:13

## 2020-03-21 RX ADMIN — CARVEDILOL 6.25 MG: 6.25 TABLET, FILM COATED ORAL at 17:44

## 2020-03-21 NOTE — ASSESSMENT & PLAN NOTE
X-ray demonstrated left lingular pneumonia    Patient also has had significant bilateral wheezing and rhonchi on exam  Presumed tracheobronchitis  Continue steroids, scheduled breathing treatments and respiratory protocol  Continue serial exams    It appears when she is sitting her oxygen sats between 88-90%  Will provide supplemental oxygen as needed  Will need home oxygen evaluation prior to discharge

## 2020-03-21 NOTE — ASSESSMENT & PLAN NOTE
No results found for: HGBA1C    Recent Labs     03/20/20  0547 03/20/20  1048 03/20/20  1540 03/20/20  2104   POCGLU 98 119 141* 161*       Blood Sugar Average: Last 72 hrs:  (P) 127 2  · On metformin at home    Will hold while inpatient  · SSI, frequent POC glucose checks  · Hypoglycemic protocol in place  · Low carb diet

## 2020-03-21 NOTE — ASSESSMENT & PLAN NOTE
Patient reports with 1 week of dry cough and congestion as well as headaches  Patient presented to urgent care on Monday, 3/16, and was diagnosed with left lingular pneumonia, and started on doxycycline, albuterol and prednisone  She reports no improvement of her symptoms  · Repeat CXR shows improving lingular pneumonia  · Patient denies any fevers or chills  She has no white blood cell count presently  · Flu/RSV negative  · Denies any recent travel or exposure to any persons with covid-19  No sick contacts whatsoever  Mild shortness of breath with coughing fits, but otherwise denies any trouble breathing  Currently saturating in mid 90s on room air    · Blood cultures pending, UA normal, lactic acid normal   Procalcitonin pending  · Sputum culture pending, urine legionella and urine strep negative  · Ceftriaxone and azithromycin started for CAP  · Continue neb treatments, Robitussin for cough  · Continue prednisone for presumed tracheobronchitis

## 2020-03-21 NOTE — PROGRESS NOTES
Progress Note - Shima Francisco 1955, 77 y o  female MRN: [id number]    Unit/Bed#: -01 Encounter: [id number]    Primary Care Provider: Jeffrey Stover MD   Date and time admitted to hospital: 3/19/2020  8:31 PM        * Community-acquired pneumonia  Assessment & Plan  Patient reports with 1 week of dry cough and congestion as well as headaches  Patient presented to urgent care on Monday, 3/16, and was diagnosed with left lingular pneumonia, and started on doxycycline, albuterol and prednisone  She reports no improvement of her symptoms  · Repeat CXR shows improving lingular pneumonia  · Patient denies any fevers or chills  She has no white blood cell count presently  · Flu/RSV negative  · Denies any recent travel or exposure to any persons with covid-19  No sick contacts whatsoever  Mild shortness of breath with coughing fits, but otherwise denies any trouble breathing  Currently saturating in mid 90s on room air  · Blood cultures pending, UA normal, lactic acid normal   Procalcitonin pending  · Sputum culture pending, urine legionella and urine strep negative  · Ceftriaxone and azithromycin started for CAP  · Continue neb treatments, Robitussin for cough  · Continue prednisone for presumed tracheobronchitis    Acute tracheobronchitis  Assessment & Plan  X-ray demonstrated left lingular pneumonia    Patient also has had significant bilateral wheezing and rhonchi on exam  Presumed tracheobronchitis  Continue steroids, scheduled breathing treatments and respiratory protocol  Continue serial exams    It appears when she is sitting her oxygen sats between 88-90%  Will provide supplemental oxygen as needed  Will need home oxygen evaluation prior to discharge    Hypernatremia  Assessment & Plan  Likely secondary to dehydration  Resolved with fluid hydration    Type 2 diabetes mellitus without complication, without long-term current use of insulin ([facility] [address] )  Assessment & Plan  No results found for: HGBA1C    Recent Labs     20  0547 20  1048 20  1540 20  2104   POCGLU 98 119 141* 161*       Blood Sugar Average: Last 72 hrs:  (P) 127 2  · On metformin at home  Will hold while inpatient  · SSI, frequent POC glucose checks  · Hypoglycemic protocol in place  · Low carb diet    Hyperlipidemia  Assessment & Plan  · On simvastatin at home, switch to pravastatin while inpatient    Essential hypertension  Assessment & Plan  · Continue home medications of amlodipine, carvedilol, doxazosin, losartan  · Monitoring per unit protocol  · BP stable at present      VTE Pharmacologic Prophylaxis:   Pharmacologic: Enoxaparin (Lovenox)  Mechanical VTE Prophylaxis in Place: Yes    Patient Centered Rounds: I have performed bedside rounds with nursing staff today  Discussions with Specialists or Other Care Team Provider:     Education and Discussions with Family / Patient: patient    Time Spent for Care: 30 minutes  More than 50% of total time spent on counseling and coordination of care as described above  Current Length of Stay: 2 day(s)    Current Patient Status: Inpatient   Certification Statement: The patient will continue to require additional inpatient hospital stay due to above medical problems    Discharge Plan:  Anticipate discharge tomorrow  Will need home oxygen testing prior to discharge    Code Status: Level 1 - Full Code      Subjective:   Patient seen and examined  Continues to have cough and shortness of breath on exertion  However feels better than yesterday  Feels weak and tired  No fevers or chills  No chest pain  No nausea vomiting or abdominal pain or diarrhea    Tolerating antibiotics well    Objective:     Vitals:   Temp (24hrs), Av 1 °F (36 7 °C), Min:97 9 °F (36 6 °C), Max:98 5 °F (36 9 °C)    Temp:  [97 9 °F (36 6 °C)-98 5 °F (36 9 °C)] 98 °F (36 7 °C)  HR:  [73-87] 77  Resp:  [16-18] 16  BP: (128-149)/(65-78) 128/65  SpO2:  [89 %-95 %] 89 %  Body mass index is 37 25 kg/m²  Input and Output Summary (last 24 hours): Intake/Output Summary (Last 24 hours) at 3/21/2020 0849  Last data filed at 3/21/2020 1896  Gross per 24 hour   Intake 960 ml   Output 400 ml   Net 560 ml       Physical Exam:     Physical Exam   Constitutional: She is oriented to person, place, and time  She appears well-developed and well-nourished  HENT:   Head: Normocephalic and atraumatic  Eyes: Pupils are equal, round, and reactive to light  EOM are normal    Neck: Normal range of motion  Neck supple  Cardiovascular: Normal rate and regular rhythm  Pulmonary/Chest: Effort normal and breath sounds normal    Abdominal: Soft  Bowel sounds are normal    Musculoskeletal: Normal range of motion  Neurological: She is alert and oriented to person, place, and time  Skin: Skin is warm and dry           Additional Data:     Labs:    Results from last 7 days   Lab Units 03/20/20 0526 03/19/20 2058   WBC Thousand/uL 5 92 6 79   HEMOGLOBIN g/dL 11 9 13 6   HEMATOCRIT % 38 8 42 5   PLATELETS Thousands/uL 254 308   BANDS PCT % 5  --    NEUTROS PCT %  --  63   LYMPHS PCT %  --  25   LYMPHO PCT % 22  --    MONOS PCT %  --  11   MONO PCT % 15*  --    EOS PCT % 0 0     Results from last 7 days   Lab Units 03/20/20 0526 03/19/20 2057   SODIUM mmol/L 144 146*   POTASSIUM mmol/L 3 3* 4 2   CHLORIDE mmol/L 107 105   CO2 mmol/L 28 30   BUN mg/dL 21 25   CREATININE mg/dL 0 64 0 72   ANION GAP mmol/L 9 11   CALCIUM mg/dL 8 8 9 8   ALBUMIN g/dL  --  4 0   TOTAL BILIRUBIN mg/dL  --  0 30   ALK PHOS U/L  --  118*   ALT U/L  --  60   AST U/L  --  55*   GLUCOSE RANDOM mg/dL 104 113     Results from last 7 days   Lab Units 03/19/20 2057   INR  0 99     Results from last 7 days   Lab Units 03/20/20  2104 03/20/20  1540 03/20/20  1048 03/20/20  0547 03/19/20  2341   POC GLUCOSE mg/dl 161* 141* 119 98 117         Results from last 7 days   Lab Units 03/20/20  0526 03/19/20  2256 03/19/20 2100 03/19/20 2057 LACTIC ACID mmol/L  --  0 8 0 9  --    PROCALCITONIN ng/ml <0 05  --   --  <0 05     * I Have Reviewed All Lab Data Listed Above  * Additional Pertinent Lab Tests Reviewed: Jenna [facility] Admission Reviewed    Imaging:    Imaging Reports Reviewed Today Include:   Imaging Personally Reviewed by Myself Includes:  cxr    Recent Cultures (last 7 days):     Results from last 7 days   Lab Units 03/20/20  0100 03/19/20  2106 03/19/20  2100   BLOOD CULTURE   --  No Growth at 24 hrs  No Growth at 24 hrs  LEGIONELLA URINARY ANTIGEN  Negative  --   --        Last 24 Hours Medication List:     Current Facility-Administered Medications:  acetaminophen 650 mg Oral Q6H PRN Brenita Potters, PA-C    albuterol 2 puff Inhalation Q4H PRN Selma Angela MD    amLODIPine 5 mg Oral Daily Brenita Potters, PA-C    aspirin 81 mg Oral Daily Brenita Potters, PA-C    cefTRIAXone 1,000 mg Intravenous Q24H Brenita Potters, PA-C Last Rate: 1,000 mg (03/20/20 2202)   And        azithromycin 500 mg Intravenous Q24H Brenita Potters, PA-C Last Rate: 500 mg (03/20/20 2248)   carvedilol 6 25 mg Oral BID Brenita Potters, PA-C    docusate sodium 100 mg Oral BID Brenita Potters, PA-C    doxazosin 1 mg Oral HS Brenita Potters, PA-C    enoxaparin 40 mg Subcutaneous Daily Brenita Potters, PA-C    HYDROcodone-homatropine 5 mL Oral Q4H PRN Ro Fields MD    insulin lispro 1-5 Units Subcutaneous TID AC Brenita Potters, PA-C    levalbuterol 0 63 mg Nebulization TID Ro Fields MD    losartan 100 mg Oral Daily Brenita Potters, PA-C    ondansetron 4 mg Intravenous Q6H PRN Brenita Potters, PA-C    pravastatin 40 mg Oral Daily With Domingo Liriano, PA-C    predniSONE 40 mg Oral Daily Ro Fields MD         Today, Patient Was Seen By: Ro Fields MD    ** Please Note: Dictation voice to text software may have been used in the creation of this document   **

## 2020-03-22 VITALS
SYSTOLIC BLOOD PRESSURE: 148 MMHG | HEIGHT: 64 IN | OXYGEN SATURATION: 90 % | WEIGHT: 217 LBS | RESPIRATION RATE: 18 BRPM | BODY MASS INDEX: 37.05 KG/M2 | DIASTOLIC BLOOD PRESSURE: 76 MMHG | HEART RATE: 66 BPM | TEMPERATURE: 97.8 F

## 2020-03-22 LAB
ANION GAP SERPL CALCULATED.3IONS-SCNC: 7 MMOL/L (ref 4–13)
BACTERIA SPT RESP CULT: ABNORMAL
BUN SERPL-MCNC: 22 MG/DL (ref 5–25)
CALCIUM SERPL-MCNC: 9.1 MG/DL (ref 8.3–10.1)
CHLORIDE SERPL-SCNC: 106 MMOL/L (ref 100–108)
CO2 SERPL-SCNC: 29 MMOL/L (ref 21–32)
CREAT SERPL-MCNC: 0.82 MG/DL (ref 0.6–1.3)
GFR SERPL CREATININE-BSD FRML MDRD: 74 ML/MIN/1.73SQ M
GLUCOSE SERPL-MCNC: 103 MG/DL (ref 65–140)
GLUCOSE SERPL-MCNC: 117 MG/DL (ref 65–140)
GLUCOSE SERPL-MCNC: 86 MG/DL (ref 65–140)
GRAM STN SPEC: ABNORMAL
POTASSIUM SERPL-SCNC: 3.8 MMOL/L (ref 3.5–5.3)
SODIUM SERPL-SCNC: 142 MMOL/L (ref 136–145)

## 2020-03-22 PROCEDURE — 82948 REAGENT STRIP/BLOOD GLUCOSE: CPT

## 2020-03-22 PROCEDURE — 94760 N-INVAS EAR/PLS OXIMETRY 1: CPT

## 2020-03-22 PROCEDURE — 99239 HOSP IP/OBS DSCHRG MGMT >30: CPT | Performed by: INTERNAL MEDICINE

## 2020-03-22 PROCEDURE — 94640 AIRWAY INHALATION TREATMENT: CPT

## 2020-03-22 PROCEDURE — 80048 BASIC METABOLIC PNL TOTAL CA: CPT | Performed by: INTERNAL MEDICINE

## 2020-03-22 PROCEDURE — 94761 N-INVAS EAR/PLS OXIMETRY MLT: CPT

## 2020-03-22 RX ORDER — PREDNISONE 10 MG/1
TABLET ORAL
Qty: 18 TABLET | Refills: 0 | Status: SHIPPED | OUTPATIENT
Start: 2020-03-22

## 2020-03-22 RX ORDER — LEVALBUTEROL INHALATION SOLUTION 0.63 MG/3ML
0.63 SOLUTION RESPIRATORY (INHALATION)
Qty: 10 VIAL | Refills: 0 | Status: SHIPPED | OUTPATIENT
Start: 2020-03-22

## 2020-03-22 RX ORDER — CEFUROXIME AXETIL 500 MG/1
500 TABLET ORAL EVERY 12 HOURS SCHEDULED
Qty: 8 TABLET | Refills: 0 | Status: SHIPPED | OUTPATIENT
Start: 2020-03-22 | End: 2020-03-26

## 2020-03-22 RX ADMIN — PREDNISONE 40 MG: 20 TABLET ORAL at 08:55

## 2020-03-22 RX ADMIN — ENOXAPARIN SODIUM 40 MG: 40 INJECTION SUBCUTANEOUS at 08:56

## 2020-03-22 RX ADMIN — ASPIRIN 81 MG 81 MG: 81 TABLET ORAL at 08:55

## 2020-03-22 RX ADMIN — CARVEDILOL 6.25 MG: 6.25 TABLET, FILM COATED ORAL at 08:55

## 2020-03-22 RX ADMIN — AMLODIPINE BESYLATE 5 MG: 5 TABLET ORAL at 09:06

## 2020-03-22 RX ADMIN — LOSARTAN POTASSIUM 100 MG: 50 TABLET, FILM COATED ORAL at 08:55

## 2020-03-22 RX ADMIN — HYDROCODONE BITARTRATE AND HOMATROPINE METHYLBROMIDE 5 ML: 5; 1.5 SOLUTION ORAL at 01:33

## 2020-03-22 RX ADMIN — LEVALBUTEROL HYDROCHLORIDE 0.63 MG: 0.63 SOLUTION RESPIRATORY (INHALATION) at 08:30

## 2020-03-22 NOTE — DISCHARGE INSTRUCTIONS
Community Acquired Pneumonia   WHAT YOU NEED TO KNOW:   What is community-acquired pneumonia (CAP)? CAP is a lung infection that you get outside of a hospital or nursing home setting  Your lungs become inflamed and cannot work well  CAP may be caused by bacteria, viruses, or fungi  What increases my risk for CAP? · Chronic lung disease    · Cigarette smoking    · Brain disorders such as stroke, dementia, and cerebral palsy    · Weakened immune system    · Recent surgery or trauma    · Surgery for cancer of the mouth, throat, or neck    · Medical conditions such as diabetes or heart disease  What are the signs and symptoms of CAP?   · Cough that may bring up green, yellow, or bloody mucus    · Fever, chills, or severe shaking    · Shortness of breath    · Breathing and heartbeat that are faster than usual    · Pain in your chest or back when you breathe in or cough    · Fatigue and loss of appetite    · Trouble thinking clearly (especially in elderly people)  How is CAP diagnosed? Your healthcare provider will listen to your lungs for abnormal sounds  You may also need any of the following:  · X-ray or CT scan  pictures may show a lung infection or other problems, such as fluid around your lungs  You may be given contrast liquid to help your lungs show up better in the pictures  Tell the healthcare provider if you have ever had an allergic reaction to contrast liquid  · A pulse oximeter  is a device that measures the amount of oxygen in your blood  · Blood and sputum tests  may be done to check for the germ causing your infection  · Bronchoscopy  is a procedure to look inside your airway and learn the cause of your airway or lung condition  A bronchoscope (thin tube with a light) is inserted into your mouth and moved down your throat to your airway  You may be given medicine to numb your throat and help you relax during the procedure   Tissue and fluid may be collected from your airway or lungs to be tested  How is CAP treated? Treatment will depend on what type of germ is causing your CAP, and how bad your symptoms are  You may need antibiotics if your pneumonia is caused by bacteria  Antiviral medicines may be given if you have viral pneumonia  You may need medicines that dilate your bronchial tubes  You may need oxygen if your blood oxygen level is lower than it should be  You may need to be admitted to the hospital if your pneumonia is severe  What can I do to manage CAP?   · Do not smoke or allow others to smoke around you  Nicotine and other chemicals in cigarettes and cigars can cause lung damage  Ask your healthcare provider for information if you currently smoke and need help to quit  E-cigarettes or smokeless tobacco still contain nicotine  Talk to your healthcare provider before you use these products  · Breathe warm, moist air  This helps loosen mucus  Loosely place a warm, wet washcloth over your nose and mouth  A room humidifier may also help make the air moist     · Take deep breaths  Deep breaths help open your airway  Take 2 deep breaths and cough 2 or 3 times every hour  Coughing helps get mucus out of your body  · Drink liquids as directed  Ask your healthcare provider how much liquid to drink each day and which liquids to drink  Liquids help make mucus thin and easier to get out of your body  · Gently tap your chest   This helps loosen mucus so it is easier to cough  Lie with your head lower than your chest several times a day and tap your chest      · Get plenty of rest   Rest helps your body heal   How can I prevent CAP? · Wash your hands often with soap and water  Carry germ-killing hand gel with you  You can use the gel to clean your hands when soap and water are not available  Do not touch your eyes, nose, or mouth unless you have washed your hands first      · Clean surfaces often    Clean doorknobs, countertops, cell phones, and other surfaces that are touched often  · Always cover your mouth when you cough  Cough into a tissue or your shirtsleeve so you do not spread germs from your hands  · Try to avoid people who have a cold or the flu  If you are sick, stay away from others as much as possible  · Ask about vaccines  You may need a vaccine to help prevent pneumonia  Get an influenza (flu) vaccine every year as soon as it becomes available  When should I seek immediate care? · You are confused and cannot think clearly  · You have increased trouble breathing  · Your lips or fingernails turn gray or blue  When should I contact my healthcare provider? · Your symptoms do not get better, or get worse  · You are urinating less, or not at all  · You have questions or concerns about your condition or care  CARE AGREEMENT:   You have the right to help plan your care  Learn about your health condition and how it may be treated  Discuss treatment options with your caregivers to decide what care you want to receive  You always have the right to refuse treatment  The above information is an  only  It is not intended as medical advice for individual conditions or treatments  Talk to your doctor, nurse or pharmacist before following any medical regimen to see if it is safe and effective for you  © 2017 [employer]  Information is for End User's use only and may not be sold, redistributed or otherwise used for commercial purposes  All illustrations and images included in CareNotes® are the copyrighted property of [employer]  or [employer]

## 2020-03-22 NOTE — SOCIAL WORK
Pt needs nebulizer  Cm met w pt who is agreeable to [facility]  Cm delivered dme from closet and referral to [facility]   imm given

## 2020-03-22 NOTE — ASSESSMENT & PLAN NOTE
Patient reports with 1 week of dry cough and congestion as well as headaches  Patient presented to urgent care on Monday, 3/16, and was diagnosed with left lingular pneumonia, and started on doxycycline, albuterol and prednisone  She reports no improvement of her symptoms  · Repeat CXR shows improving lingular pneumonia  · Patient denies any fevers or chills  She has no white blood cell count presently  · Flu/RSV negative  · Denies any recent travel or exposure to any persons with covid-19  No sick contacts whatsoever  Mild shortness of breath with coughing fits, but otherwise denies any trouble breathing  Currently saturating in mid 90s on room air  · Blood cultures pending, UA normal, lactic acid normal   Procalcitonin pending  · urine legionella and urine strep negative      · Ceftriaxone and azithromycin started for CAP, changed to Ceftin on discharge    · Continue neb treatments, Robitussin for cough  · Continue prednisone taper for presumed tracheobronchitis

## 2020-03-22 NOTE — ASSESSMENT & PLAN NOTE
No results found for: HGBA1C    Recent Labs     03/21/20  1547 03/21/20  2134 03/22/20  0713 03/22/20  1137   POCGLU 154* 142* 86 117       Blood Sugar Average: Last 72 hrs:  (P) 186 [id number]  · On metformin at home    Will hold while inpatient  · SSI, frequent POC glucose checks  · Hypoglycemic protocol in place  · Low carb diet

## 2020-03-22 NOTE — DISCHARGE SUMMARY
Discharge- Shima Francisco 1955, 77 y o  female MRN: [id number]    Unit/Bed#: -01 Encounter: [id number]    Primary Care Provider: Beatrice Padilla MD   Date and time admitted to hospital: 3/19/2020  8:31 PM        * Community-acquired pneumonia  Assessment & Plan  Patient reports with 1 week of dry cough and congestion as well as headaches  Patient presented to urgent care on Monday, 3/16, and was diagnosed with left lingular pneumonia, and started on doxycycline, albuterol and prednisone  She reports no improvement of her symptoms  · Repeat CXR shows improving lingular pneumonia  · Patient denies any fevers or chills  She has no white blood cell count presently  · Flu/RSV negative  · Denies any recent travel or exposure to any persons with covid-19  No sick contacts whatsoever  Mild shortness of breath with coughing fits, but otherwise denies any trouble breathing  Currently saturating in mid 90s on room air  · Blood cultures pending, UA normal, lactic acid normal   Procalcitonin pending  · urine legionella and urine strep negative      · Ceftriaxone and azithromycin started for CAP, changed to Ceftin on discharge  · Continue neb treatments, Robitussin for cough  · Continue prednisone taper for presumed tracheobronchitis    Acute tracheobronchitis  Assessment & Plan  X-ray demonstrated left lingular pneumonia    Patient also has had significant bilateral wheezing and rhonchi on exam  Presumed tracheobronchitis  Continue steroids, scheduled breathing treatments and respiratory protocol  Continue serial exams    It appears when she is sitting her oxygen sats between 88-90%  Will provide supplemental oxygen as needed  Does not qualify for home oxygen    Hypernatremia  Assessment & Plan  Likely secondary to dehydration  Resolved with fluid hydration    Type 2 diabetes mellitus without complication, without long-term current use of insulin ([facility] [address] )  Assessment & Plan  No results found for: HGBA1C    Recent Labs     03/21/20  1547 03/21/20  2134 03/22/20  0713 03/22/20  1137   POCGLU 154* 142* 86 117       Blood Sugar Average: Last 72 hrs:  (P) 100 [id number]  · On metformin at home  Will hold while inpatient  · SSI, frequent POC glucose checks  · Hypoglycemic protocol in place  · Low carb diet    Essential hypertension  Assessment & Plan  · Continue home medications of amlodipine, carvedilol, doxazosin, losartan  · Monitoring per unit protocol  · BP stable at present      Discharging Physician / Practitioner: Arleth Erickson MD  PCP: Junior Lima MD  Admission Date:   Admission Orders (From admission, onward)     Ordered        03/19/20 2151  Inpatient Admission  Once                   Discharge Date: 03/22/20    Resolved Problems  Date Reviewed: 3/22/2020    None          Consultations During Hospital Stay:  · None    Procedures Performed:   None    Significant Findings / Test Results:   · Chest x-ray  Improving lingular pneumonia    Incidental Findings:   · None    Test Results Pending at Discharge (will require follow up): · None     Outpatient Tests Requested:  · None    Complications:  None    Reason for Admission:     Hospital Course:     Vilma Haq is a 77 y o  female patient with past medical history of obesity, hypertension, hyperlipidemia, type 2 diabetes who originally presented to the hospital on 3/19/2020 due to cough congestion  She was initially diagnosed with community-acquired pneumonia on left side few days prior to presentation was started on doxycycline however as the symptoms got worse she presented to ER for further evaluation  Chest x-ray on admission again demonstrated left-sided lingular pneumonia however was improved compared to prior x-ray  Patient was started on Rocephin, azithromycin for presumed cap as well as respiratory protocol and scheduled breathing treatments    Patient's symptoms improved with the above management, on the day of discharge was satting greater than 95% on room air  Did not requiring supplemental oxygen  She is also placed on oral prednisone taper for presumed tracheobronchitis  Other chronic medical problems have been stable    Please see above list of diagnoses and related plan for additional information  Condition at Discharge: stable     Discharge Day Visit / Exam:     Subjective:  Patient seen and examined  No acute complaints at this time  Vitals: Blood Pressure: 148/76 (03/22/20 0751)  Pulse: 66 (03/22/20 0751)  Temperature: 97 8 °F (36 6 °C) (03/22/20 0751)  Respirations: 18 (03/22/20 0751)  Height: 5' 4" (162 6 cm) (03/19/20 2029)  Weight - Scale: 98 4 kg (217 lb) (03/19/20 2029)  SpO2: 90 % (03/22/20 1038)  Exam:   Physical Exam   Constitutional: She is oriented to person, place, and time  She appears well-developed and well-nourished  HENT:   Head: Normocephalic and atraumatic  Eyes: Pupils are equal, round, and reactive to light  EOM are normal    Neck: Normal range of motion  Neck supple  Cardiovascular: Normal rate and regular rhythm  Pulmonary/Chest: Effort normal and breath sounds normal  No respiratory distress  She has no wheezes  She has no rales  Abdominal: Soft  Bowel sounds are normal    Musculoskeletal: Normal range of motion  Neurological: She is alert and oriented to person, place, and time  Skin: Skin is warm and dry  Discussion with Family:  Patient    Discharge instructions/Information to patient and family:   See after visit summary for information provided to patient and family  Provisions for Follow-Up Care:  See after visit summary for information related to follow-up care and any pertinent home health orders  Disposition:     Home    Planned Readmission:     Discharge Statement:  I spent 35 minutes discharging the patient  This time was spent on the day of discharge  I had direct contact with the patient on the day of discharge   Greater than 50% of the total time was spent examining patient, answering all patient questions, arranging and discussing plan of care with patient as well as directly providing post-discharge instructions  Additional time then spent on discharge activities  Discharge Medications:  See after visit summary for reconciled discharge medications provided to patient and family        ** Please Note: This note has been constructed using a voice recognition system **

## 2020-03-22 NOTE — RESPIRATORY THERAPY NOTE
Home Oxygen Qualifying Test       Patient name: Kendell Boswell        : 1955   Date of Test:  2020  Diagnosis: CAP     Home Oxygen Test:    **Medicare Guidelines require item(s) 1-5 on all ambulatory patients or 1 and 2 on non-ambulatory patients  1   Baseline SPO2 on Room Air at rest 89 %  2   SPO2 during exercise on Room Air 90 %  During exercise monitor SpO2  If SPO2 increases >=89% with ambulation do not add supplemental             oxygen  If <= 88% on room air add O2 via NC and titrate patient  Patient must be ambulated with O2 and titrated to > 88% with exertion  3   SPO2 on Oxygen at rest n/a % n/a lpm     4   SPO2 during exercise on Oxygen  N/a % a liter flow of n/a lpm     5   Exercise performed:          walking, duration 6 (min), distance 500 (feet)          []  Supplemental Home Oxygen is indicated  [x]  Client does not qualify for home oxygen        Respiratory Additional Notes- patient awake and alert with mild respiratory distress patient tolerated walking 500 feet without acute desaturation below 89% and therefore does not qualify for oxygen therapy at home  [facility] [place], RT dysphagia 3, soft, thin liquids/Ensure Enlive 240mls 3x daily (1050kcal, 60g protein).

## 2020-03-22 NOTE — PLAN OF CARE
Problem: RESPIRATORY - ADULT  Goal: Achieves optimal ventilation and oxygenation  Description  INTERVENTIONS:  - Assess for changes in respiratory status  - Assess for changes in mentation and behavior  - Position to facilitate oxygenation and minimize respiratory effort  - Oxygen administered by appropriate delivery if ordered  - Initiate smoking cessation education as indicated  - Encourage broncho-pulmonary hygiene including cough, deep breathe, Incentive Spirometry  - Assess the need for suctioning and aspirate as needed  - Assess and instruct to report SOB or any respiratory difficulty  - Respiratory Therapy support as indicated  Outcome: Progressing     Problem: INFECTION - ADULT  Goal: Absence or prevention of progression during hospitalization  Description  INTERVENTIONS:  - Assess and monitor for signs and symptoms of infection  - Monitor lab/diagnostic results  - Monitor all insertion sites, i e  indwelling lines, tubes, and drains  - Monitor endotracheal if appropriate and nasal secretions for changes in amount and color  - [place] appropriate cooling/warming therapies per order  - Administer medications as ordered  - Instruct and encourage patient and family to use good hand hygiene technique  - Identify and instruct in appropriate isolation precautions for identified infection/condition  Outcome: Progressing     Problem: Potential for Falls  Goal: Patient will remain free of falls  Description  INTERVENTIONS:  - Assess patient frequently for physical needs  -  Identify cognitive and physical deficits and behaviors that affect risk of falls    -  [place] fall precautions as indicated by assessment   - Educate patient/family on patient safety including physical limitations  - Instruct patient to call for assistance with activity based on assessment  - Modify environment to reduce risk of injury  - Consider OT/PT consult to assist with strengthening/mobility  Outcome: Progressing     Problem: SAFETY ADULT  Goal: Maintain or return mobility status to optimal level  Description  INTERVENTIONS:  - Assess patient's baseline mobility status (ambulation, transfers, stairs, etc )    - Identify cognitive and physical deficits and behaviors that affect mobility  - Identify mobility aids required to assist with transfers and/or ambulation (gait belt, sit-to-stand, lift, walker, cane, etc )  - [place] fall precautions as indicated by assessment  - Record patient progress and toleration of activity level on Mobility SBAR; progress patient to next Phase/Stage  - Instruct patient to call for assistance with activity based on assessment  - Consider rehabilitation consult to assist with strengthening/weightbearing, etc   Outcome: Progressing     Problem: DISCHARGE PLANNING  Goal: Discharge to home or other facility with appropriate resources  Description  INTERVENTIONS:  - Identify barriers to discharge w/patient and caregiver  - Arrange for needed discharge resources and transportation as appropriate  - Identify discharge learning needs (meds, wound care, etc )  - Arrange for interpretive services to assist at discharge as needed  - Refer to Case Management Department for coordinating discharge planning if the patient needs post-hospital services based on physician/advanced practitioner order or complex needs related to functional status, cognitive ability, or social support system  Outcome: Progressing

## 2020-03-22 NOTE — ASSESSMENT & PLAN NOTE
X-ray demonstrated left lingular pneumonia    Patient also has had significant bilateral wheezing and rhonchi on exam  Presumed tracheobronchitis  Continue steroids, scheduled breathing treatments and respiratory protocol  Continue serial exams    It appears when she is sitting her oxygen sats between 88-90%  Will provide supplemental oxygen as needed  Does not qualify for home oxygen

## 2020-03-25 LAB
BACTERIA BLD CULT: NORMAL
BACTERIA BLD CULT: NORMAL

## 2020-03-30 ENCOUNTER — OFFICE VISIT (OUTPATIENT)
Dept: URGENT CARE | Facility: CLINIC | Age: 65
End: 2020-03-30
Payer: MEDICARE

## 2020-03-30 VITALS
DIASTOLIC BLOOD PRESSURE: 80 MMHG | HEART RATE: 76 BPM | OXYGEN SATURATION: 93 % | BODY MASS INDEX: 36.54 KG/M2 | HEIGHT: 64 IN | SYSTOLIC BLOOD PRESSURE: 174 MMHG | TEMPERATURE: 98.5 F | WEIGHT: 214 LBS | RESPIRATION RATE: 18 BRPM

## 2020-03-30 DIAGNOSIS — J18.9 LINGULAR PNEUMONIA: Primary | ICD-10-CM

## 2020-03-30 PROCEDURE — 99213 OFFICE O/P EST LOW 20 MIN: CPT | Performed by: PHYSICIAN ASSISTANT

## 2020-03-30 PROCEDURE — G0463 HOSPITAL OUTPT CLINIC VISIT: HCPCS | Performed by: PHYSICIAN ASSISTANT

## 2020-03-30 RX ORDER — DOXYCYCLINE HYCLATE 100 MG/1
CAPSULE ORAL
COMMUNITY

## 2020-03-30 NOTE — PROGRESS NOTES
[address]        NAME: Nichole Duncan is a 77 y o  female  : 1955    MRN: [id number]  DATE: 2020  TIME: 2:06 PM    Assessment and Plan   Lingular pneumonia [J18 9]  1  Lingular pneumonia  XR chest pa & lateral         Patient Instructions     Discussed condition with patient  Chest x-ray shows resolving lingular pneumonia  She is stable at this time, her lungs are clear, and she is no longer complaining of any symptoms  She may continue with symptomatic management as needed and should be re-evaluated if symptoms recur  Follow up with PCP in 3-5 days  Proceed to  ER if symptoms worsen  Chief Complaint     Chief Complaint   Patient presents with    Pneumonia     Pt states she's here for a follow up  She was discharged last  from the hospital and treated for pneumonia  States her PCP is in [address] [place] [address] and cannot travel there right now  Pt states she's feeling better, no cough, fevers, or SOB  History of Present Illness       Patient presents for re-evaluation from recent visit to the ER on 2020  She was diagnosed at lingular pneumonia and was placed on antibiotics, steroids, nebulizer and she is feeling significantly better  She has at the end of her antibiotic course and is here to be reassessed  She reports some residual cough but denies any shortness of breath, fever, or any other significant symptoms at this time  Review of Systems   Review of Systems   Constitutional: Negative  Respiratory: Positive for cough  Negative for shortness of breath  Cardiovascular: Negative  Gastrointestinal: Negative  Genitourinary: Negative            Current Medications       Current Outpatient Medications:     albuterol (ProAir HFA) 90 mcg/act inhaler, Inhale 2 puffs every 4 (four) hours as needed for wheezing or shortness of breath, Disp: 8 5 g, Rfl: 1    amLODIPine (NORVASC) 5 mg tablet, amlodipine 5 mg tablet, Disp: , Rfl:     Ascorbic Acid (VITAMIN C ER) 1000 MG TBCR, ascorbic acid (vitamin C) 1,000 mg chewable tablet, Disp: , Rfl:     ASPIRIN 81 PO, Take 1 tablet every day by oral route , Disp: , Rfl:     carvedilol (COREG) 6 25 mg tablet, TAKE ONE TABLET BY MOUTH TWICE A DAY, Disp: , Rfl:     doxazosin (CARDURA) 1 mg tablet, Take 1 mg by mouth daily at bedtime, Disp: , Rfl:     doxycycline hyclate (VIBRAMYCIN) 100 mg capsule, doxycycline hyclate 100 mg capsule, Disp: , Rfl:     levalbuterol (XOPENEX) 0 63 mg/3 mL nebulizer solution, Take 1 vial (0 63 mg total) by nebulization 3 (three) times a day, Disp: 10 vial, Rfl: 0    losartan (COZAAR) 100 MG tablet, Take 100 mg by mouth daily, Disp: , Rfl:     metFORMIN (GLUCOPHAGE-XR) 500 mg 24 hr tablet, 4 tablets once daily, Disp: , Rfl:     Multiple Vitamin (DAILY VALUE MULTIVITAMIN) TABS, Take by mouth, Disp: , Rfl:     omega-3-acid ethyl esters (LOVAZA) 1 g capsule, 1 tablet daily, Disp: , Rfl:     simvastatin (ZOCOR) 20 mg tablet, 1 tablet daily, Disp: , Rfl:     benzonatate (TESSALON PERLES) 100 mg capsule, Take 1 capsule (100 mg total) by mouth 3 (three) times a day as needed for cough (Patient not taking: Reported on 3/19/2020), Disp: 30 capsule, Rfl: 0    benzonatate (TESSALON) 200 MG capsule, Take 1 capsule (200 mg total) by mouth 3 (three) times a day as needed for cough, Disp: 20 capsule, Rfl: 0    predniSONE 10 mg tablet, 30 mg for 3 days, 20 mg for 3 days, 10 mg for 3 days and then stop (Patient not taking: Reported on 3/30/2020), Disp: 18 tablet, Rfl: 0    Current Allergies     Allergies as of 03/30/2020 - Reviewed 03/19/2020   Allergen Reaction Noted    Penicillins  01/19/2016            The following portions of the patient's history were reviewed and updated as appropriate: allergies, current medications, past family history, past medical history, past social history, past surgical history and problem list      Past Medical History:   Diagnosis Date    Diabetes mellitus ([facility] [address] )     Hyperlipemia     Hypertension        Past Surgical History:   Procedure Laterality Date    TONSILLECTOMY      TUBAL LIGATION         Family History   Problem Relation Age of Onset    Hypertension Mother     Hypertension Father     Cancer Father     Heart disease Father          Medications have been verified  Objective   BP (!) 174/80   Pulse 76   Temp 98 5 °F (36 9 °C) (Oral)   Resp 18   Ht 5' 4" (1 626 m)   Wt 97 1 kg (214 lb)   SpO2 93%   BMI 36 73 kg/m²        Physical Exam     Physical Exam   Constitutional: She is oriented to person, place, and time  She appears well-developed and well-nourished  No distress  Cardiovascular: Normal rate, regular rhythm and normal heart sounds  No murmur heard  Pulmonary/Chest: Effort normal and breath sounds normal  No respiratory distress  Neurological: She is alert and oriented to person, place, and time  Psychiatric: She has a normal mood and affect  Vitals reviewed